# Patient Record
Sex: FEMALE | Race: BLACK OR AFRICAN AMERICAN | NOT HISPANIC OR LATINO | Employment: FULL TIME | URBAN - METROPOLITAN AREA
[De-identification: names, ages, dates, MRNs, and addresses within clinical notes are randomized per-mention and may not be internally consistent; named-entity substitution may affect disease eponyms.]

---

## 2017-09-05 ENCOUNTER — HOSPITAL ENCOUNTER (EMERGENCY)
Facility: CLINIC | Age: 47
Discharge: HOME | End: 2017-09-05
Attending: NURSE PRACTITIONER | Admitting: NURSE PRACTITIONER
Payer: COMMERCIAL

## 2017-09-05 ENCOUNTER — APPOINTMENT (OUTPATIENT)
Dept: GENERAL RADIOLOGY | Facility: CLINIC | Age: 47
End: 2017-09-05
Attending: NURSE PRACTITIONER
Payer: COMMERCIAL

## 2017-09-05 VITALS
RESPIRATION RATE: 18 BRPM | SYSTOLIC BLOOD PRESSURE: 124 MMHG | HEART RATE: 80 BPM | DIASTOLIC BLOOD PRESSURE: 76 MMHG | OXYGEN SATURATION: 99 % | TEMPERATURE: 98.7 F

## 2017-09-05 DIAGNOSIS — M25.561 ACUTE PAIN OF RIGHT KNEE: ICD-10-CM

## 2017-09-05 LAB
BASOPHILS # BLD AUTO: 0 10E9/L (ref 0–0.2)
BASOPHILS NFR BLD AUTO: 0 %
CRP SERPL-MCNC: <2.9 MG/L (ref 0–8)
DIFFERENTIAL METHOD BLD: ABNORMAL
EOSINOPHIL # BLD AUTO: 0.1 10E9/L (ref 0–0.7)
EOSINOPHIL NFR BLD AUTO: 3.1 %
ERYTHROCYTE [DISTWIDTH] IN BLOOD BY AUTOMATED COUNT: 12.8 % (ref 10–15)
ERYTHROCYTE [SEDIMENTATION RATE] IN BLOOD BY WESTERGREN METHOD: 10 MM/H (ref 0–20)
HCT VFR BLD AUTO: 37.2 % (ref 35–47)
HGB BLD-MCNC: 12.4 G/DL (ref 11.7–15.7)
IMM GRANULOCYTES # BLD: 0 10E9/L (ref 0–0.4)
IMM GRANULOCYTES NFR BLD: 0 %
LYMPHOCYTES # BLD AUTO: 1.1 10E9/L (ref 0.8–5.3)
LYMPHOCYTES NFR BLD AUTO: 27.5 %
MCH RBC QN AUTO: 31.3 PG (ref 26.5–33)
MCHC RBC AUTO-ENTMCNC: 33.3 G/DL (ref 31.5–36.5)
MCV RBC AUTO: 94 FL (ref 78–100)
MONOCYTES # BLD AUTO: 0.3 10E9/L (ref 0–1.3)
MONOCYTES NFR BLD AUTO: 8 %
NEUTROPHILS # BLD AUTO: 2.4 10E9/L (ref 1.6–8.3)
NEUTROPHILS NFR BLD AUTO: 61.4 %
NRBC # BLD AUTO: 0 10*3/UL
NRBC BLD AUTO-RTO: 0 /100
PLATELET # BLD AUTO: 281 10E9/L (ref 150–450)
RBC # BLD AUTO: 3.96 10E12/L (ref 3.8–5.2)
WBC # BLD AUTO: 3.9 10E9/L (ref 4–11)

## 2017-09-05 PROCEDURE — 85652 RBC SED RATE AUTOMATED: CPT | Performed by: NURSE PRACTITIONER

## 2017-09-05 PROCEDURE — 85025 COMPLETE CBC W/AUTO DIFF WBC: CPT | Performed by: NURSE PRACTITIONER

## 2017-09-05 PROCEDURE — 99284 EMERGENCY DEPT VISIT MOD MDM: CPT

## 2017-09-05 PROCEDURE — 73562 X-RAY EXAM OF KNEE 3: CPT | Mod: RT

## 2017-09-05 PROCEDURE — 36415 COLL VENOUS BLD VENIPUNCTURE: CPT | Performed by: NURSE PRACTITIONER

## 2017-09-05 PROCEDURE — 86140 C-REACTIVE PROTEIN: CPT | Performed by: NURSE PRACTITIONER

## 2017-09-05 RX ORDER — OXYCODONE AND ACETAMINOPHEN 5; 325 MG/1; MG/1
1-2 TABLET ORAL EVERY 6 HOURS PRN
Qty: 20 TABLET | Refills: 0 | Status: SHIPPED | OUTPATIENT
Start: 2017-09-05 | End: 2019-06-08

## 2017-09-05 ASSESSMENT — ENCOUNTER SYMPTOMS
CONSTITUTIONAL NEGATIVE: 1
CARDIOVASCULAR NEGATIVE: 1
GASTROINTESTINAL NEGATIVE: 1
RESPIRATORY NEGATIVE: 1
NEUROLOGICAL NEGATIVE: 1
PSYCHIATRIC NEGATIVE: 1

## 2017-09-05 NOTE — ED AVS SNAPSHOT
[facility] Emergency Department    [address]    [facility] [address]    Phone:  [phone]    Fax:  [phone]                                       Mary Anne Angel   MRN: [id number]    Department:  [facility] Emergency Department   Date of Visit:  9/5/2017           Patient Information     Date Of Birth          1970        Your diagnoses for this visit were:     Acute pain of right knee        You were seen by Pilo Brady, JAYDA CNP.      Follow-up Information     Follow up In 1 week.    Why:  f/u with [facility]         Follow up with [facility].    Contact information:    [address], [facility] [address]  [facility] [address]  [phone]        Discharge References/Attachments     KNEE PAIN (ENGLISH)      24 Hour Appointment Hotline       To make an appointment at any [facility] clinic, call [phone]). If you don't have a family doctor or clinic, we will help you find one. [facility] clinics are conveniently located to serve the needs of you and your family.             Review of your medicines      Notice     You have not been prescribed any medications.            Procedures and tests performed during your visit     CBC with platelets differential    CRP inflammation    Erythrocyte sedimentation rate auto    Knee XR, 3 views, right      Orders Needing Specimen Collection     None      Pending Results     No orders found from 9/3/2017 to 9/6/2017.            Pending Culture Results     No orders found from 9/3/2017 to 9/6/2017.            Pending Results Instructions     If you had any lab results that were not finalized at the time of your Discharge, you can call the ED Lab Result RN at [phone]. You will be contacted by this team for any positive Lab results or changes in treatment. The nurses are available 7 days a week from 10A to 6:30P.  You can leave a message 24 hours per day and they will return your call.         Test Results From Your Hospital Stay        9/5/2017  9:10 PM      Narrative     KNEE RIGHT THREE VIEWS  9/5/2017 8:27 PM     HISTORY: Pain and swelling.        Impression     IMPRESSION: Three views of the right knee are performed. No fracture  or dislocation. Suprapatellar effusion is present. No significant  degenerative change. Sunrise view is unremarkable with possible mild  widening of the patellofemoral joint space possibly due to an  effusion.    KM FERREIRA MD         9/5/2017  9:47 PM      Component Results     Component Value Ref Range & Units Status    WBC 3.9 (L) 4.0 - 11.0 10e9/L Final    RBC Count 3.96 3.8 - 5.2 10e12/L Final    Hemoglobin 12.4 11.7 - 15.7 g/dL Final    Hematocrit 37.2 35.0 - 47.0 % Final    MCV 94 78 - 100 fl Final    MCH 31.3 26.5 - 33.0 pg Final    MCHC 33.3 31.5 - 36.5 g/dL Final    RDW 12.8 10.0 - 15.0 % Final    Platelet Count 281 150 - 450 10e9/L Final    Diff Method Automated Method  Final    % Neutrophils 61.4 % Final    % Lymphocytes 27.5 % Final    % Monocytes 8.0 % Final    % Eosinophils 3.1 % Final    % Basophils 0.0 % Final    % Immature Granulocytes 0.0 % Final    Nucleated RBCs 0 0 /100 Final    Absolute Neutrophil 2.4 1.6 - 8.3 10e9/L Final    Absolute Lymphocytes 1.1 0.8 - 5.3 10e9/L Final    Absolute Monocytes 0.3 0.0 - 1.3 10e9/L Final    Absolute Eosinophils 0.1 0.0 - 0.7 10e9/L Final    Absolute Basophils 0.0 0.0 - 0.2 10e9/L Final    Abs Immature Granulocytes 0.0 0 - 0.4 10e9/L Final    Absolute Nucleated RBC 0.0  Final         9/5/2017 10:03 PM      Component Results     Component Value Ref Range & Units Status    CRP Inflammation <2.9 0.0 - 8.0 mg/L Final         9/5/2017  9:52 PM      Component Results     Component Value Ref Range & Units Status    Sed Rate 10 0 - 20 mm/h Final                Clinical Quality Measure: Blood Pressure Screening     Your blood pressure was checked while you were in the emergency department today. The last reading we  "obtained was  BP: 124/76 . Please read the guidelines below about what these numbers mean and what you should do about them.  If your systolic blood pressure (the top number) is less than 120 and your diastolic blood pressure (the bottom number) is less than 80, then your blood pressure is normal. There is nothing more that you need to do about it.  If your systolic blood pressure (the top number) is 120-139 or your diastolic blood pressure (the bottom number) is 80-89, your blood pressure may be higher than it should be. You should have your blood pressure rechecked within a year by a primary care provider.  If your systolic blood pressure (the top number) is 140 or greater or your diastolic blood pressure (the bottom number) is 90 or greater, you may have high blood pressure. High blood pressure is treatable, but if left untreated over time it can put you at risk for heart attack, stroke, or kidney failure. You should have your blood pressure rechecked by a primary care provider within the next 4 weeks.  If your provider in the emergency department today gave you specific instructions to follow-up with your doctor or provider even sooner than that, you should follow that instruction and not wait for up to 4 weeks for your follow-up visit.        Thank you for choosing [facility]       Thank you for choosing [facility] for your care. Our goal is always to provide you with excellent care. Hearing back from our patients is one way we can continue to improve our services. Please take a few minutes to complete the written survey that you may receive in the mail after you visit with us. Thank you!        [employer] Information     [employer] lets you send messages to your doctor, view your test results, renew your prescriptions, schedule appointments and more. To sign up, go to [email or web address] . Click on \"Log in\" on the left side of the screen, which will take you to the Welcome page. Then click on \"Sign up Now\" on the " right side of the page.     You will be asked to enter the access code listed below, as well as some personal information. Please follow the directions to create your username and password.     Your access code is: [email or web address]  Expires: 2017 10:13 PM     Your access code will  in 90 days. If you need help or a new code, please call your [facility] clinic or [phone].        Care EveryWhere ID     This is your Care EveryWhere ID. This could be used by other organizations to access your [facility] medical records  [id number]        Equal Access to Services     [facility] : Adeline Martinez, hakan blount, chuck gallo, ani nix . So [facility] [phone].    ATENCIÓN: Si habla español, tiene a azar disposición servicios gratuitos de asistencia lingüística. Llame al [phone].    We comply with applicable federal civil rights laws and [place] laws. We do not discriminate on the basis of race, color, national origin, age, disability sex, sexual orientation or gender identity.            After Visit Summary       This is your record. Keep this with you and show to your community pharmacist(s) and doctor(s) at your next visit.

## 2017-09-05 NOTE — ED AVS SNAPSHOT
[facility] Emergency Department    [address]    [facility] [address]    Phone:  [phone]    Fax:  [phone]                                       Mary Anne Angel   MRN: [id number]    Department:  [facility] Emergency Department   Date of Visit:  9/5/2017           After Visit Summary Signature Page     I have received my discharge instructions, and my questions have been answered. I have discussed any challenges I see with this plan with the nurse or doctor.    ..........................................................................................................................................  Patient/Patient Representative Signature      ..........................................................................................................................................  Patient Representative Print Name and Relationship to Patient    ..................................................               ................................................  Date                                            Time    ..........................................................................................................................................  Reviewed by Signature/Title    ...................................................              ..............................................  Date                                                            Time

## 2017-09-06 NOTE — ED PROVIDER NOTES
History     Chief Complaint:    Knee Pain      HPI   Mary Anne Angel is a 46 year old female who presents with right knee pain ongoing for 9 days, denies trauma, has swelling and pain with ambulation. Denies hip, calf or ankle pain. Denies SOB. OTC medications not effective for pain.  Has history of Lupus and arthritis.     Allergies:    No Known Allergies     Medications:        oxyCODONE-acetaminophen (PERCOCET) 5-325 MG per tablet       Problem List:      There are no active problems to display for this patient.       Past Medical History:      Past Medical History:   Diagnosis Date     Arthritis      Lupus (H)      Lupus (H)        Past Surgical History:      History reviewed. No pertinent surgical history.    Family History:      No family history on file.    Social History:    Marital Status:   [2]  Social History   Substance Use Topics     Smoking status: Current Every Day Smoker     Smokeless tobacco: Not on file     Alcohol use Yes        Review of Systems   Constitutional: Negative.    HENT: Negative.    Respiratory: Negative.    Cardiovascular: Negative.    Gastrointestinal: Negative.    Genitourinary: Negative.    Musculoskeletal:        Right knee pain and swelling.   Skin: Negative.    Neurological: Negative.    Psychiatric/Behavioral: Negative.        Physical Exam   First Vitals:  BP: 117/85  Pulse: 85  Temp: 98.7  F (37.1  C)  Resp: 16  SpO2: 99 %      Physical Exam    Eyes: Pupils equally round  HENT: Head is normal in appearance. Oropharynx is normal with moist mucus membranes.  Cardiovascular: Normal color of mucus membranes  Respiratory: Normal respiratory effort  Musculoskeletal: Right knee soft tissue swelling, no signs of infection. Pulses and CMS intact to lower extremity. ROM normal to knee and hip. No calf tenderness, negative Homans signs.   Skin: Normal, without rash.  Lymphatic: No edema  Neurologic: Cranial nerves grossly intact, normal cognition, no apparent  deficits.  Psychiatric: Normal affect.      Emergency Department Course     Imaging:  IMPRESSION: Three views of the right knee are performed. No fracture or dislocation. Suprapatellar effusion is present. No significant degenerative change. Sunrise view is unremarkable with possible mild widening of the patellofemoral joint space possibly due to an effusion.    Laboratory:  CRP <2.9 NL  Sed rate 10 NL  Wbc 3.9 L    Emergency Department Course:    ED Course       Impression & Plan      Medical Decision Making:    Mary Anne 46 female presents with right knee pain ongoing for 9 days, denies trauma. On exam has mild soft tissue swelling, normal ROM, no evidence of cellulitis. Due to duration of pain XR was performed showed no fracture or dislocation. Suprapatellar effusion is present. I do not suspect a septic joint or infection with a low wbc, the sed rate is within normal, CRP 2.9 not elevated patient is afebrile and does not appear toxic. There is no evidence of systemic illness. I did consider DVT its unlikely patient has no calf tenderness, SOB no imaging indicated. Recommend follow up with Orthopedic Clinic within one week, Provided return precautions, including but not limited to fever, SOB, chest pain, increased pain or swelling etc. Patient was discharged in stable condition.       Diagnosis:    ICD-10-CM    1. Acute pain of right knee M25.561 CBC with platelets differential     CRP inflammation     Erythrocyte sedimentation rate auto       Disposition:  discharged to home    Discharge Medications:  There are no discharge medications for this patient.        Pilo Brady  9/5/2017   [facility] EMERGENCY DEPARTMENT       Pilo Brady, APRN CNP  09/05/17 6690
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
## 2018-02-22 ENCOUNTER — APPOINTMENT (OUTPATIENT)
Dept: GENERAL RADIOLOGY | Facility: CLINIC | Age: 48
End: 2018-02-22
Attending: INTERNAL MEDICINE
Payer: COMMERCIAL

## 2018-02-22 ENCOUNTER — HOSPITAL ENCOUNTER (EMERGENCY)
Facility: CLINIC | Age: 48
Discharge: HOME | End: 2018-02-22
Attending: INTERNAL MEDICINE | Admitting: INTERNAL MEDICINE
Payer: COMMERCIAL

## 2018-02-22 VITALS
TEMPERATURE: 98.3 F | DIASTOLIC BLOOD PRESSURE: 83 MMHG | RESPIRATION RATE: 16 BRPM | WEIGHT: 150 LBS | HEIGHT: 69 IN | SYSTOLIC BLOOD PRESSURE: 124 MMHG | OXYGEN SATURATION: 98 % | BODY MASS INDEX: 22.22 KG/M2 | HEART RATE: 90 BPM

## 2018-02-22 DIAGNOSIS — R79.89 ELEVATED D-DIMER: ICD-10-CM

## 2018-02-22 DIAGNOSIS — R07.1 CHEST PAIN ON BREATHING: ICD-10-CM

## 2018-02-22 LAB
ALBUMIN SERPL-MCNC: 3.8 G/DL (ref 3.4–5)
ALP SERPL-CCNC: 59 U/L (ref 40–150)
ALT SERPL W P-5'-P-CCNC: 34 U/L (ref 0–50)
ANION GAP SERPL CALCULATED.3IONS-SCNC: 8 MMOL/L (ref 3–14)
AST SERPL W P-5'-P-CCNC: 32 U/L (ref 0–45)
BASOPHILS # BLD AUTO: 0 10E9/L (ref 0–0.2)
BASOPHILS NFR BLD AUTO: 0.5 %
BILIRUB SERPL-MCNC: 0.4 MG/DL (ref 0.2–1.3)
BUN SERPL-MCNC: 13 MG/DL (ref 7–30)
CALCIUM SERPL-MCNC: 8.4 MG/DL (ref 8.5–10.1)
CHLORIDE SERPL-SCNC: 108 MMOL/L (ref 94–109)
CO2 SERPL-SCNC: 24 MMOL/L (ref 20–32)
CREAT SERPL-MCNC: 0.57 MG/DL (ref 0.52–1.04)
D DIMER PPP FEU-MCNC: 1.3 UG/ML FEU (ref 0–0.5)
DIFFERENTIAL METHOD BLD: ABNORMAL
EOSINOPHIL # BLD AUTO: 0 10E9/L (ref 0–0.7)
EOSINOPHIL NFR BLD AUTO: 0.7 %
ERYTHROCYTE [DISTWIDTH] IN BLOOD BY AUTOMATED COUNT: 15.3 % (ref 10–15)
GFR SERPL CREATININE-BSD FRML MDRD: >90 ML/MIN/1.7M2
GLUCOSE SERPL-MCNC: 80 MG/DL (ref 70–99)
HCT VFR BLD AUTO: 37.2 % (ref 35–47)
HGB BLD-MCNC: 11.8 G/DL (ref 11.7–15.7)
IMM GRANULOCYTES # BLD: 0 10E9/L (ref 0–0.4)
IMM GRANULOCYTES NFR BLD: 0.2 %
LYMPHOCYTES # BLD AUTO: 1.3 10E9/L (ref 0.8–5.3)
LYMPHOCYTES NFR BLD AUTO: 32.4 %
MCH RBC QN AUTO: 26.4 PG (ref 26.5–33)
MCHC RBC AUTO-ENTMCNC: 31.7 G/DL (ref 31.5–36.5)
MCV RBC AUTO: 83 FL (ref 78–100)
MONOCYTES # BLD AUTO: 0.4 10E9/L (ref 0–1.3)
MONOCYTES NFR BLD AUTO: 10.1 %
NEUTROPHILS # BLD AUTO: 2.3 10E9/L (ref 1.6–8.3)
NEUTROPHILS NFR BLD AUTO: 56.1 %
NRBC # BLD AUTO: 0 10*3/UL
NRBC BLD AUTO-RTO: 0 /100
PLATELET # BLD AUTO: 426 10E9/L (ref 150–450)
POTASSIUM SERPL-SCNC: 3.8 MMOL/L (ref 3.4–5.3)
PROT SERPL-MCNC: 7.6 G/DL (ref 6.8–8.8)
RBC # BLD AUTO: 4.47 10E12/L (ref 3.8–5.2)
SODIUM SERPL-SCNC: 140 MMOL/L (ref 133–144)
TROPONIN I SERPL-MCNC: <0.015 UG/L (ref 0–0.04)
WBC # BLD AUTO: 4.1 10E9/L (ref 4–11)

## 2018-02-22 PROCEDURE — 80053 COMPREHEN METABOLIC PANEL: CPT | Performed by: INTERNAL MEDICINE

## 2018-02-22 PROCEDURE — 99285 EMERGENCY DEPT VISIT HI MDM: CPT | Mod: 25

## 2018-02-22 PROCEDURE — 93005 ELECTROCARDIOGRAM TRACING: CPT

## 2018-02-22 PROCEDURE — 85025 COMPLETE CBC W/AUTO DIFF WBC: CPT | Performed by: INTERNAL MEDICINE

## 2018-02-22 PROCEDURE — 85379 FIBRIN DEGRADATION QUANT: CPT | Performed by: INTERNAL MEDICINE

## 2018-02-22 PROCEDURE — 84484 ASSAY OF TROPONIN QUANT: CPT | Performed by: INTERNAL MEDICINE

## 2018-02-22 PROCEDURE — 71046 X-RAY EXAM CHEST 2 VIEWS: CPT

## 2018-02-23 LAB — INTERPRETATION ECG - MUSE: NORMAL

## 2018-02-23 NOTE — ED NOTES
Pt in with C/O intermittent chest pain for approx. 2 weeks. Pt reports pain has been constant for the past 2 hours. Pt A&Ox4 on arrival ABC's intact.

## 2018-02-23 NOTE — ED PROVIDER NOTES
"  History     Chief Complaint:  Chest Pain    HPI   Mary Anne Angel is a 47 year old female smoker who presents with chest pain. The patient states that for the last couple of weeks she has been experiencing intermittent chest pain every day. The pain she currently reports began a few hours ago and she describes it as pain in the left and center chest that makes it painful to inhale. The patient notes that for a few months she has had sores in her nose making it painful and difficult to breathe. She thinks that anxiety may be playing a role in this pain as she is beginning an inpatient alcohol treatment program tomorrow. The patient most recently drank alcohol earlier today. The patient denies any congestion or cough that is worse than usual.     CARDIAC RISK FACTORS:  Sex:    Female  Tobacco:   Positive  Hypertension:   Negative  Diabetes:   Negative  Family History:  Negative    PE/DVT RISK FACTORS:  Sex:    Female  Hormones:   Negative  Tobacco:   Positive  Travel:   Negative  Other immobilization: Negative  Personal history:  Negative  Family history:  Negative    Allergies:  Azithromycin     Medications:    Percocet     Past Medical History:    Arthritis  Lupus    Past Surgical History:    Surgical history reviewed. No pertinent surgical history.     Family History:    History reviewed. No pertinent family history.     Social History:  Smoking Status: Current every day smoker  Alcohol Use: Positive  Marital Status:      Review of Systems   HENT: Negative for congestion.         Nasal sores   Respiratory:        Difficulty breathing   Cardiovascular: Positive for chest pain.   All other systems reviewed and are negative.    Physical Exam     Patient Vitals for the past 24 hrs:   BP Temp Temp src Pulse Heart Rate Resp SpO2 Height Weight   02/22/18 2130 124/83 - - - 81 - 98 % - -   02/22/18 2043 135/90 98.3  F (36.8  C) Oral 90 - 16 99 % 1.753 m (5' 9\") 68 kg (150 lb)     Physical Exam   Constitutional: " She is cooperative.   HENT:   Right Ear: Tympanic membrane normal.   Left Ear: Tympanic membrane normal.   Mouth/Throat: Oropharynx is clear and moist and mucous membranes are normal.   Areas of superficial ulceration and crusting in nose   Eyes: Conjunctivae are normal.   Neck: Normal range of motion.   Cardiovascular: Regular rhythm and normal heart sounds.    Pulmonary/Chest: Effort normal and breath sounds normal. She exhibits no tenderness.   Abdominal: Soft. Normal appearance and bowel sounds are normal. There is no rebound and no guarding.   Musculoskeletal: Normal range of motion.   Lymphadenopathy:     She has no cervical adenopathy.   Neurological: She is alert.   Skin: Skin is warm and dry.   Psychiatric: She has a normal mood and affect.     Emergency Department Course     Imaging:  Radiology findings were communicated with the patient who voiced understanding of the findings.    XR Chest 2 Views  Negative exam.  MARTINE ALVARES MD  Reading per radiology    Laboratory:  Laboratory findings were communicated with the patient who voiced understanding of the findings.    CBC: WBC 4.1, HGB 11.8,   CMP: Calcium 8.4 (L) o/w WNL (Creatinine 0.57)  Troponin I (Collected 2120): <0.015    D Dimer (Collected 2120): 1.3 (H)     Emergency Department Course:    2053 Nursing notes and vitals reviewed.    2102 I performed an exam of the patient as documented above.     2120 IV was inserted and blood was drawn for laboratory testing, results above.     2148 The patient was sent for a XR Chest 2 Views while in the emergency department, results above.      2210 The patient left the emergency department.     Impression & Plan      Medical Decision Making:  Mary Anne Angel is a 47 year old female who presents to the emergency department today complaining of two kinds of chest pain. There is the constant chest pain but then also a new pleuritic pain. With this is was concerned about the possibility of pulmonary  embolus. D-dimer returned elevated. I went to the room to discuss with the patient that we needed to proceed to pulmonary angiogram but she was not in the room. Nursing staff found that she had taken her clothes, left her gown, and apparently pulled out her own IV> she did not speak to any staff about her desire to leave and I am unsure why she would have left. 12-lead EKG is unremarkable and troponin returned negative. We were unable to formulate a plan for discharge.     Diagnosis:    ICD-10-CM    1. Chest pain on breathing R07.1    2. Elevated d-dimer R79.89      Disposition:   The patient left the emergency department.     Scribe Disclosure:  I, Compa Jones, am serving as a scribe at 9:24 PM on 2/22/2018 to document services personally performed by Gianna Navarro MD, based on my observations and the provider's statements to me.    [facility] EMERGENCY DEPARTMENT       Gianna Navarro MD  02/22/18 1059

## 2019-06-08 ENCOUNTER — HOSPITAL ENCOUNTER (INPATIENT)
Facility: CLINIC | Age: 49
LOS: 1 days | Discharge: HOME | DRG: 897 | End: 2019-06-09
Attending: INTERNAL MEDICINE | Admitting: PSYCHIATRY & NEUROLOGY
Payer: COMMERCIAL

## 2019-06-08 DIAGNOSIS — F10.220 ACUTE ALCOHOLIC INTOXICATION IN ALCOHOLISM WITHOUT COMPLICATION (H): ICD-10-CM

## 2019-06-08 PROBLEM — F10.10 ALCOHOL ABUSE: Status: ACTIVE | Noted: 2019-06-08

## 2019-06-08 LAB
ALBUMIN SERPL-MCNC: 3.5 G/DL (ref 3.4–5)
ALBUMIN UR-MCNC: NEGATIVE MG/DL
ALCOHOL BREATH TEST: 0.16 (ref 0–0.01)
ALP SERPL-CCNC: 62 U/L (ref 40–150)
ALT SERPL W P-5'-P-CCNC: 19 U/L (ref 0–50)
AMPHETAMINES UR QL SCN: NEGATIVE
ANION GAP SERPL CALCULATED.3IONS-SCNC: 13 MMOL/L (ref 3–14)
APPEARANCE UR: CLEAR
AST SERPL W P-5'-P-CCNC: 18 U/L (ref 0–45)
BARBITURATES UR QL: NEGATIVE
BASOPHILS # BLD AUTO: 0 10E9/L (ref 0–0.2)
BASOPHILS NFR BLD AUTO: 0.2 %
BENZODIAZ UR QL: NEGATIVE
BILIRUB SERPL-MCNC: 0.5 MG/DL (ref 0.2–1.3)
BILIRUB UR QL STRIP: NEGATIVE
BUN SERPL-MCNC: 10 MG/DL (ref 7–30)
CALCIUM SERPL-MCNC: 8 MG/DL (ref 8.5–10.1)
CANNABINOIDS UR QL SCN: NEGATIVE
CHLORIDE SERPL-SCNC: 110 MMOL/L (ref 94–109)
CO2 SERPL-SCNC: 18 MMOL/L (ref 20–32)
COCAINE UR QL: NEGATIVE
COLOR UR AUTO: ABNORMAL
CREAT SERPL-MCNC: 0.77 MG/DL (ref 0.52–1.04)
CRP SERPL-MCNC: <2.9 MG/L (ref 0–8)
DIFFERENTIAL METHOD BLD: ABNORMAL
EOSINOPHIL # BLD AUTO: 0 10E9/L (ref 0–0.7)
EOSINOPHIL NFR BLD AUTO: 0.6 %
ERYTHROCYTE [DISTWIDTH] IN BLOOD BY AUTOMATED COUNT: 16.9 % (ref 10–15)
ETHANOL SERPL-MCNC: 0.21 G/DL
ETHANOL UR QL SCN: POSITIVE
GFR SERPL CREATININE-BSD FRML MDRD: >90 ML/MIN/{1.73_M2}
GLUCOSE SERPL-MCNC: 113 MG/DL (ref 70–99)
GLUCOSE UR STRIP-MCNC: NEGATIVE MG/DL
HCG UR QL: NEGATIVE
HCT VFR BLD AUTO: 33 % (ref 35–47)
HGB BLD-MCNC: 10.3 G/DL (ref 11.7–15.7)
HGB UR QL STRIP: NEGATIVE
IMM GRANULOCYTES # BLD: 0 10E9/L (ref 0–0.4)
IMM GRANULOCYTES NFR BLD: 0.2 %
INR PPP: 1.03 (ref 0.86–1.14)
KETONES UR STRIP-MCNC: NEGATIVE MG/DL
LEUKOCYTE ESTERASE UR QL STRIP: NEGATIVE
LIPASE SERPL-CCNC: 79 U/L (ref 73–393)
LYMPHOCYTES # BLD AUTO: 1.4 10E9/L (ref 0.8–5.3)
LYMPHOCYTES NFR BLD AUTO: 29.4 %
MAGNESIUM SERPL-MCNC: 2 MG/DL (ref 1.6–2.3)
MCH RBC QN AUTO: 24 PG (ref 26.5–33)
MCHC RBC AUTO-ENTMCNC: 31.2 G/DL (ref 31.5–36.5)
MCV RBC AUTO: 77 FL (ref 78–100)
MONOCYTES # BLD AUTO: 0.3 10E9/L (ref 0–1.3)
MONOCYTES NFR BLD AUTO: 6.9 %
MUCOUS THREADS #/AREA URNS LPF: PRESENT /LPF
NEUTROPHILS # BLD AUTO: 2.9 10E9/L (ref 1.6–8.3)
NEUTROPHILS NFR BLD AUTO: 62.7 %
NITRATE UR QL: NEGATIVE
NRBC # BLD AUTO: 0 10*3/UL
NRBC BLD AUTO-RTO: 0 /100
OPIATES UR QL SCN: NEGATIVE
PH UR STRIP: 5 PH (ref 5–7)
PLATELET # BLD AUTO: 285 10E9/L (ref 150–450)
POTASSIUM SERPL-SCNC: 3.2 MMOL/L (ref 3.4–5.3)
PROT SERPL-MCNC: 7.2 G/DL (ref 6.8–8.8)
RBC # BLD AUTO: 4.3 10E12/L (ref 3.8–5.2)
RBC #/AREA URNS AUTO: 1 /HPF (ref 0–2)
SODIUM SERPL-SCNC: 141 MMOL/L (ref 133–144)
SOURCE: ABNORMAL
SP GR UR STRIP: 1.01 (ref 1–1.03)
SQUAMOUS #/AREA URNS AUTO: 1 /HPF (ref 0–1)
TROPONIN I SERPL-MCNC: <0.015 UG/L (ref 0–0.04)
UROBILINOGEN UR STRIP-MCNC: NORMAL MG/DL (ref 0–2)
WBC # BLD AUTO: 4.6 10E9/L (ref 4–11)
WBC #/AREA URNS AUTO: 0 /HPF (ref 0–5)

## 2019-06-08 PROCEDURE — 80307 DRUG TEST PRSMV CHEM ANLYZR: CPT | Performed by: INTERNAL MEDICINE

## 2019-06-08 PROCEDURE — 96361 HYDRATE IV INFUSION ADD-ON: CPT | Performed by: INTERNAL MEDICINE

## 2019-06-08 PROCEDURE — 80320 DRUG SCREEN QUANTALCOHOLS: CPT | Performed by: INTERNAL MEDICINE

## 2019-06-08 PROCEDURE — 81025 URINE PREGNANCY TEST: CPT | Performed by: INTERNAL MEDICINE

## 2019-06-08 PROCEDURE — 82075 ASSAY OF BREATH ETHANOL: CPT | Performed by: INTERNAL MEDICINE

## 2019-06-08 PROCEDURE — 25000132 ZZH RX MED GY IP 250 OP 250 PS 637: Performed by: INTERNAL MEDICINE

## 2019-06-08 PROCEDURE — 93005 ELECTROCARDIOGRAM TRACING: CPT | Performed by: INTERNAL MEDICINE

## 2019-06-08 PROCEDURE — 80053 COMPREHEN METABOLIC PANEL: CPT | Performed by: INTERNAL MEDICINE

## 2019-06-08 PROCEDURE — 93010 ELECTROCARDIOGRAM REPORT: CPT | Mod: Z6 | Performed by: INTERNAL MEDICINE

## 2019-06-08 PROCEDURE — 83690 ASSAY OF LIPASE: CPT | Performed by: INTERNAL MEDICINE

## 2019-06-08 PROCEDURE — 99284 EMERGENCY DEPT VISIT MOD MDM: CPT | Mod: 25 | Performed by: INTERNAL MEDICINE

## 2019-06-08 PROCEDURE — 99285 EMERGENCY DEPT VISIT HI MDM: CPT | Mod: 25 | Performed by: INTERNAL MEDICINE

## 2019-06-08 PROCEDURE — 83735 ASSAY OF MAGNESIUM: CPT | Performed by: INTERNAL MEDICINE

## 2019-06-08 PROCEDURE — 85610 PROTHROMBIN TIME: CPT | Performed by: INTERNAL MEDICINE

## 2019-06-08 PROCEDURE — 12800008 ZZH R&B CD ADULT

## 2019-06-08 PROCEDURE — 96360 HYDRATION IV INFUSION INIT: CPT | Performed by: INTERNAL MEDICINE

## 2019-06-08 PROCEDURE — 86140 C-REACTIVE PROTEIN: CPT | Performed by: INTERNAL MEDICINE

## 2019-06-08 PROCEDURE — 25000128 H RX IP 250 OP 636: Performed by: INTERNAL MEDICINE

## 2019-06-08 PROCEDURE — HZ2ZZZZ DETOXIFICATION SERVICES FOR SUBSTANCE ABUSE TREATMENT: ICD-10-PCS | Performed by: PSYCHIATRY & NEUROLOGY

## 2019-06-08 PROCEDURE — 84484 ASSAY OF TROPONIN QUANT: CPT | Performed by: INTERNAL MEDICINE

## 2019-06-08 PROCEDURE — 81001 URINALYSIS AUTO W/SCOPE: CPT | Performed by: INTERNAL MEDICINE

## 2019-06-08 PROCEDURE — 85025 COMPLETE CBC W/AUTO DIFF WBC: CPT | Performed by: INTERNAL MEDICINE

## 2019-06-08 RX ORDER — GABAPENTIN 100 MG/1
100-200 CAPSULE ORAL 2 TIMES DAILY PRN
Status: DISCONTINUED | OUTPATIENT
Start: 2019-06-08 | End: 2019-06-09 | Stop reason: HOSPADM

## 2019-06-08 RX ORDER — LANOLIN ALCOHOL/MO/W.PET/CERES
100 CREAM (GRAM) TOPICAL ONCE
Status: COMPLETED | OUTPATIENT
Start: 2019-06-08 | End: 2019-06-08

## 2019-06-08 RX ORDER — FOLIC ACID 1 MG/1
1 TABLET ORAL ONCE
Status: COMPLETED | OUTPATIENT
Start: 2019-06-08 | End: 2019-06-08

## 2019-06-08 RX ORDER — BUPROPION HYDROCHLORIDE 150 MG/1
150 TABLET ORAL EVERY MORNING
COMMUNITY
End: 2020-02-25

## 2019-06-08 RX ORDER — ATENOLOL 50 MG/1
50 TABLET ORAL DAILY PRN
Status: DISCONTINUED | OUTPATIENT
Start: 2019-06-08 | End: 2019-06-09 | Stop reason: HOSPADM

## 2019-06-08 RX ORDER — NICOTINE 21 MG/24HR
1 PATCH, TRANSDERMAL 24 HOURS TRANSDERMAL DAILY
Status: DISCONTINUED | OUTPATIENT
Start: 2019-06-09 | End: 2019-06-09 | Stop reason: HOSPADM

## 2019-06-08 RX ORDER — GABAPENTIN 100 MG/1
100-200 CAPSULE ORAL 2 TIMES DAILY PRN
COMMUNITY
End: 2021-03-11

## 2019-06-08 RX ORDER — ALUMINA, MAGNESIA, AND SIMETHICONE 2400; 2400; 240 MG/30ML; MG/30ML; MG/30ML
30 SUSPENSION ORAL EVERY 4 HOURS PRN
Status: DISCONTINUED | OUTPATIENT
Start: 2019-06-08 | End: 2019-06-09 | Stop reason: HOSPADM

## 2019-06-08 RX ORDER — FOLIC ACID 1 MG/1
1 TABLET ORAL DAILY
Status: DISCONTINUED | OUTPATIENT
Start: 2019-06-09 | End: 2019-06-09 | Stop reason: HOSPADM

## 2019-06-08 RX ORDER — LANOLIN ALCOHOL/MO/W.PET/CERES
100 CREAM (GRAM) TOPICAL DAILY
Status: DISCONTINUED | OUTPATIENT
Start: 2019-06-09 | End: 2019-06-09 | Stop reason: HOSPADM

## 2019-06-08 RX ORDER — DISULFIRAM 250 MG/1
250 TABLET ORAL DAILY
COMMUNITY
End: 2020-02-25

## 2019-06-08 RX ORDER — ONDANSETRON 4 MG/1
4 TABLET, ORALLY DISINTEGRATING ORAL EVERY 6 HOURS PRN
Status: DISCONTINUED | OUTPATIENT
Start: 2019-06-08 | End: 2019-06-09 | Stop reason: HOSPADM

## 2019-06-08 RX ORDER — TRAZODONE HYDROCHLORIDE 50 MG/1
50 TABLET, FILM COATED ORAL
Status: DISCONTINUED | OUTPATIENT
Start: 2019-06-08 | End: 2019-06-09 | Stop reason: HOSPADM

## 2019-06-08 RX ORDER — MULTIPLE VITAMINS W/ MINERALS TAB 9MG-400MCG
1 TAB ORAL DAILY
Status: DISCONTINUED | OUTPATIENT
Start: 2019-06-09 | End: 2019-06-09 | Stop reason: HOSPADM

## 2019-06-08 RX ORDER — DIAZEPAM 5 MG
5-20 TABLET ORAL EVERY 30 MIN PRN
Status: DISCONTINUED | OUTPATIENT
Start: 2019-06-08 | End: 2019-06-09 | Stop reason: HOSPADM

## 2019-06-08 RX ORDER — ACETAMINOPHEN 325 MG/1
650 TABLET ORAL EVERY 4 HOURS PRN
Status: DISCONTINUED | OUTPATIENT
Start: 2019-06-08 | End: 2019-06-09 | Stop reason: HOSPADM

## 2019-06-08 RX ORDER — SODIUM CHLORIDE 9 MG/ML
1000 INJECTION, SOLUTION INTRAVENOUS CONTINUOUS
Status: DISCONTINUED | OUTPATIENT
Start: 2019-06-08 | End: 2019-06-08

## 2019-06-08 RX ORDER — MULTIPLE VITAMINS W/ MINERALS TAB 9MG-400MCG
1 TAB ORAL ONCE
Status: COMPLETED | OUTPATIENT
Start: 2019-06-08 | End: 2019-06-08

## 2019-06-08 RX ORDER — DIAZEPAM 5 MG
5 TABLET ORAL ONCE
Status: DISCONTINUED | OUTPATIENT
Start: 2019-06-08 | End: 2019-06-08

## 2019-06-08 RX ORDER — HYDROXYZINE HYDROCHLORIDE 25 MG/1
25 TABLET, FILM COATED ORAL EVERY 4 HOURS PRN
Status: DISCONTINUED | OUTPATIENT
Start: 2019-06-08 | End: 2019-06-09 | Stop reason: HOSPADM

## 2019-06-08 RX ADMIN — Medication 100 MG: at 17:58

## 2019-06-08 RX ADMIN — FOLIC ACID 1 MG: 1 TABLET ORAL at 17:58

## 2019-06-08 RX ADMIN — MULTIPLE VITAMINS W/ MINERALS TAB 1 TABLET: TAB at 17:58

## 2019-06-08 RX ADMIN — SODIUM CHLORIDE 1000 ML: 9 INJECTION, SOLUTION INTRAVENOUS at 17:58

## 2019-06-08 RX ADMIN — NICOTINE POLACRILEX 4 MG: 4 GUM, CHEWING ORAL at 17:58

## 2019-06-08 ASSESSMENT — ACTIVITIES OF DAILY LIVING (ADL)
HYGIENE/GROOMING: HANDWASHING;INDEPENDENT
PRIOR_FUNCTIONAL_LEVEL_COMMENT: INDEPENDENT
TOILETING: 0-->INDEPENDENT
TRANSFERRING: 0-->INDEPENDENT
RETIRED_COMMUNICATION: 0-->UNDERSTANDS/COMMUNICATES WITHOUT DIFFICULTY
ORAL_HYGIENE: INDEPENDENT
DRESS: 0-->INDEPENDENT
AMBULATION: 0-->INDEPENDENT
COGNITION: 0 - NO COGNITION ISSUES REPORTED
FALL_HISTORY_WITHIN_LAST_SIX_MONTHS: NO
SWALLOWING: 0-->SWALLOWS FOODS/LIQUIDS WITHOUT DIFFICULTY
BATHING: 0-->INDEPENDENT
DRESS: INDEPENDENT
RETIRED_EATING: 0-->INDEPENDENT
LAUNDRY: WITH SUPERVISION

## 2019-06-08 ASSESSMENT — ENCOUNTER SYMPTOMS
TREMORS: 0
VOMITING: 0
TROUBLE SWALLOWING: 0
NERVOUS/ANXIOUS: 1
DIARRHEA: 0
CHILLS: 0
WHEEZING: 0
NECK PAIN: 0
SLEEP DISTURBANCE: 1
NAUSEA: 0
SPEECH DIFFICULTY: 0
COUGH: 0
DIFFICULTY URINATING: 0
SHORTNESS OF BREATH: 0
CONFUSION: 0
ABDOMINAL PAIN: 0
BACK PAIN: 0
DYSPHORIC MOOD: 0
WEAKNESS: 0
LIGHT-HEADEDNESS: 0
DYSURIA: 0
FEVER: 0
PALPITATIONS: 0
NUMBNESS: 1
ARTHRALGIAS: 1
HEADACHES: 0
APPETITE CHANGE: 1
ADENOPATHY: 0

## 2019-06-08 ASSESSMENT — MIFFLIN-ST. JEOR: SCORE: 1425.14

## 2019-06-08 NOTE — ED PROVIDER NOTES
History     Chief Complaint   Patient presents with     Addiction Problem     ETOH: last drink 30 min ago: 750 ml vodka daily: no hx seizure: three weeks for last relapse: has bed on hold;      Addiction Problem     has palpations when going through detox     HPI  Mary Anne Angel is a 48 year old female who presents today seeking detox from alcohol. She has relapsed on alcohol 3 weeks ago, currently drinking 750 ml alcohol/ day. She has a long history of alcohol dependence. She states problem alcohol use began in her teens. She has been through treatment here in 1995 with 14 years sobriety. She had another 6 years sobriety after a second treatment. She again relapsed starting in 2017. She has had 2 treatments at [facility] last year, with a few weeks to a few months sobriety. Her most recent elapse started 3 weeks ago. She works as a , and missed a lot of work as a result of drinking., She has been drinking daily since the school year ended 3 weeks ago. She lives with her  and 14 year old twin daughters. She denies substance use other than alcohol. She had been diagnosed with Lupus in the past. She states she was on Plaquenil for that in the past, but that about 5 years ago it was decided she did not actually have lupus, but has rheumatoid arthritis. She is currently on no treatment for the arthritis. She has anxiety, tremulousness, numbness in her feet and chest pain and right ear pain when she withdraws. She had a couple of absence type seizures several years ago which were determined to be of uncertain cause and significance, she has never had a tonic/clonic motor seizure. She denies liver or pancreas issues. She has significant issues with anxiety. Sleep is poor. She has some depression only with alcohol use, but has never been diagnosed with depression. She has chest pain and numbness in her hands and feet at times that she feels may be anxiety related. No SI/HI/hallucinations.  Appetite is fair. She feels a bit dehydrated. Urine is dark. She denies fever, chills, URI symptoms, cough, sputum, shortness of breath, nausea, vomiting, abdominal pain, diarrhea. She has intermittent pain in the right ear and left ankle.    Past Medical History:   Diagnosis Date     Arthritis      Lupus      Lupus    States lupus diagnosis was incorrect.    No past surgical history on file.    No family history on file.    Social History     Tobacco Use     Smoking status: Current Every Day Smoker   Substance Use Topics     Alcohol use: Yes         I have reviewed the Medications, Allergies, Past Medical and Surgical History, and Social History in the Epic system.    Review of Systems   Constitutional: Positive for appetite change. Negative for chills and fever.   HENT: Positive for ear pain. Negative for congestion and trouble swallowing.    Eyes: Negative for visual disturbance.   Respiratory: Negative for cough, shortness of breath and wheezing.    Cardiovascular: Positive for chest pain (with withdrawal). Negative for palpitations and leg swelling.   Gastrointestinal: Negative for abdominal pain, diarrhea, nausea and vomiting.   Genitourinary: Positive for menstrual problem (irregular). Negative for difficulty urinating and dysuria.   Musculoskeletal: Positive for arthralgias. Negative for back pain and neck pain.   Skin: Negative for rash.   Neurological: Positive for numbness. Negative for tremors, speech difficulty, weakness, light-headedness and headaches.   Hematological: Negative for adenopathy.   Psychiatric/Behavioral: Positive for sleep disturbance. Negative for confusion, dysphoric mood and suicidal ideas. The patient is nervous/anxious.        Physical Exam   BP: 128/84  Pulse: 116  Temp: 98.1  F (36.7  C)  Resp: 16  SpO2: 97 %      Physical Exam   Constitutional: She is oriented to person, place, and time. She appears well-developed and well-nourished. She appears distressed.   HENT:   Head:  Normocephalic and atraumatic.   Right Ear: Hearing and tympanic membrane normal.   Left Ear: Hearing and tympanic membrane normal.   Nose: Nose normal.   Mouth/Throat: Uvula is midline, oropharynx is clear and moist and mucous membranes are normal.   Eyes: Pupils are equal, round, and reactive to light. Conjunctivae are normal. Right eye exhibits nystagmus. Left eye exhibits nystagmus.   Neck: Normal range of motion. Neck supple. No JVD present.   Cardiovascular: Regular rhythm, S1 normal, S2 normal, normal heart sounds and intact distal pulses. Tachycardia present.   Pulmonary/Chest: Effort normal and breath sounds normal. She has no wheezes. She has no rales.   Abdominal: Soft. Bowel sounds are normal. There is no tenderness. There is no rebound and no guarding.   Musculoskeletal: She exhibits no edema or tenderness.   Neurological: She is alert and oriented to person, place, and time. She displays no tremor. No cranial nerve deficit or sensory deficit. Coordination normal. GCS eye subscore is 4. GCS verbal subscore is 5. GCS motor subscore is 6.   Reflex Scores:       Bicep reflexes are 2+ on the right side and 2+ on the left side.       Brachioradialis reflexes are 2+ on the right side and 2+ on the left side.       Patellar reflexes are 2+ on the right side and 2+ on the left side.  Skin: Skin is warm and dry.   Psychiatric: She has a normal mood and affect. Her speech is normal and behavior is normal. Judgment and thought content normal. Thought content is not paranoid. Cognition and memory are normal. She expresses no homicidal and no suicidal ideation.   Nursing note and vitals reviewed.      ED Course        Procedures             EKG Interpretation:      Interpreted by LUKASZ SHARMA  Time reviewed: 1633  Symptoms at time of EKG: None   Rhythm: normal sinus   Rate: 100-110  Axis: Normal  Ectopy: none  Conduction: right bundle branch block (incomplete)  ST Segments/ T Waves: No ST-T wave changes and No  acute ischemic changes  Q Waves: none  Comparison to prior: Unchanged from 02/22/18    Clinical Impression: no acute changes and non-specific EKG    Labs/Imaging    Results for orders placed or performed during the hospital encounter of 06/08/19 (from the past 24 hour(s))   Alcohol breath test POCT   Result Value Ref Range    Alcohol Breath Test 0.162 (A) 0.00 - 0.01   CBC with platelets differential   Result Value Ref Range    WBC 4.6 4.0 - 11.0 10e9/L    RBC Count 4.30 3.8 - 5.2 10e12/L    Hemoglobin 10.3 (L) 11.7 - 15.7 g/dL    Hematocrit 33.0 (L) 35.0 - 47.0 %    MCV 77 (L) 78 - 100 fl    MCH 24.0 (L) 26.5 - 33.0 pg    MCHC 31.2 (L) 31.5 - 36.5 g/dL    RDW 16.9 (H) 10.0 - 15.0 %    Platelet Count 285 150 - 450 10e9/L    Diff Method Automated Method     % Neutrophils 62.7 %    % Lymphocytes 29.4 %    % Monocytes 6.9 %    % Eosinophils 0.6 %    % Basophils 0.2 %    % Immature Granulocytes 0.2 %    Nucleated RBCs 0 0 /100    Absolute Neutrophil 2.9 1.6 - 8.3 10e9/L    Absolute Lymphocytes 1.4 0.8 - 5.3 10e9/L    Absolute Monocytes 0.3 0.0 - 1.3 10e9/L    Absolute Eosinophils 0.0 0.0 - 0.7 10e9/L    Absolute Basophils 0.0 0.0 - 0.2 10e9/L    Abs Immature Granulocytes 0.0 0 - 0.4 10e9/L    Absolute Nucleated RBC 0.0    Comprehensive metabolic panel   Result Value Ref Range    Sodium 141 133 - 144 mmol/L    Potassium 3.2 (L) 3.4 - 5.3 mmol/L    Chloride 110 (H) 94 - 109 mmol/L    Carbon Dioxide 18 (L) 20 - 32 mmol/L    Anion Gap 13 3 - 14 mmol/L    Glucose 113 (H) 70 - 99 mg/dL    Urea Nitrogen 10 7 - 30 mg/dL    Creatinine 0.77 0.52 - 1.04 mg/dL    GFR Estimate >90 >60 mL/min/[1.73_m2]    GFR Estimate If Black >90 >60 mL/min/[1.73_m2]    Calcium 8.0 (L) 8.5 - 10.1 mg/dL    Bilirubin Total 0.5 0.2 - 1.3 mg/dL    Albumin 3.5 3.4 - 5.0 g/dL    Protein Total 7.2 6.8 - 8.8 g/dL    Alkaline Phosphatase 62 40 - 150 U/L    ALT 19 0 - 50 U/L    AST 18 0 - 45 U/L   INR   Result Value Ref Range    INR 1.03 0.86 - 1.14   Alcohol  ethyl   Result Value Ref Range    Ethanol g/dL 0.21 (H) <0.01 g/dL   Magnesium   Result Value Ref Range    Magnesium 2.0 1.6 - 2.3 mg/dL   CRP inflammation   Result Value Ref Range    CRP Inflammation <2.9 0.0 - 8.0 mg/L   Lipase   Result Value Ref Range    Lipase 79 73 - 393 U/L   Troponin I   Result Value Ref Range    Troponin I ES <0.015 0.000 - 0.045 ug/L   UA with Microscopic   Result Value Ref Range    Color Urine Light Yellow     Appearance Urine Clear     Glucose Urine Negative NEG^Negative mg/dL    Bilirubin Urine Negative NEG^Negative    Ketones Urine Negative NEG^Negative mg/dL    Specific Gravity Urine 1.006 1.003 - 1.035    Blood Urine Negative NEG^Negative    pH Urine 5.0 5.0 - 7.0 pH    Protein Albumin Urine Negative NEG^Negative mg/dL    Urobilinogen mg/dL Normal 0.0 - 2.0 mg/dL    Nitrite Urine Negative NEG^Negative    Leukocyte Esterase Urine Negative NEG^Negative    Source Midstream Urine     WBC Urine 0 0 - 5 /HPF    RBC Urine 1 0 - 2 /HPF    Squamous Epithelial /HPF Urine 1 0 - 1 /HPF    Mucous Urine Present (A) NEG^Negative /LPF   HCG qualitative urine   Result Value Ref Range    HCG Qual Urine Negative NEG^Negative   Drug abuse screen 6 urine (chem dep)   Result Value Ref Range    Amphetamine Qual Urine Negative NEG^Negative    Barbiturates Qual Urine Negative NEG^Negative    Benzodiazepine Qual Urine Negative NEG^Negative    Cannabinoids Qual Urine Negative NEG^Negative    Cocaine Qual Urine Negative NEG^Negative    Ethanol Qual Urine Positive (A) NEG^Negative    Opiates Qualitative Urine Negative NEG^Negative         Assessments & Plan (with Medical Decision Making)   Impression:  Middle aged female with a history of alcohol abuse/ dependence dating to teens. She had prolonged sobriety after treatment in 1995 and 2010. She has had less success maintaining sobriety in the past 2 years. She has been drinking daily for the past 3 weeks. 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 She has had periods of sobriety of weeks to a few months in the past 2 years. She is currently drinking 750 ml vodka daily. She as anxiety, tingling in hands and feet and chest tightness with withdrawal. She has the chest tightness associated with anxiety. She has no evidence of acute coronary syndrome. EKG is unchanged. She has unremarkable CBC, electrolytes, lipase, troponin and UA. She appears medically stable for detox treatment. She has longstanding anxiety issues. She does not have significant depression or active psychiatric symptoms.     I have reviewed the nursing notes.    I have reviewed the findings, diagnosis, plan and need for follow up with the patient.       Medication List      There are no discharge medications for this visit.         Final diagnoses:   Acute alcoholic intoxication in alcoholism without complication (H)       6/8/2019   [facility], [place], EMERGENCY DEPARTMENT     Pepe Lovell MD  06/08/19 6107

## 2019-06-08 NOTE — ED NOTES
"Pt declined the valium. States she wants the valium when she \"really needs it.\" I returned the valium to pyxis.    "

## 2019-06-09 VITALS
HEIGHT: 69 IN | DIASTOLIC BLOOD PRESSURE: 84 MMHG | BODY MASS INDEX: 23.7 KG/M2 | WEIGHT: 160 LBS | HEART RATE: 73 BPM | TEMPERATURE: 98 F | OXYGEN SATURATION: 99 % | RESPIRATION RATE: 16 BRPM | SYSTOLIC BLOOD PRESSURE: 128 MMHG

## 2019-06-09 LAB
ANION GAP SERPL CALCULATED.3IONS-SCNC: 9 MMOL/L (ref 3–14)
BUN SERPL-MCNC: 10 MG/DL (ref 7–30)
CALCIUM SERPL-MCNC: 8.1 MG/DL (ref 8.5–10.1)
CHLORIDE SERPL-SCNC: 109 MMOL/L (ref 94–109)
CHOLEST SERPL-MCNC: 130 MG/DL
CO2 SERPL-SCNC: 21 MMOL/L (ref 20–32)
CREAT SERPL-MCNC: 0.74 MG/DL (ref 0.52–1.04)
GFR SERPL CREATININE-BSD FRML MDRD: >90 ML/MIN/{1.73_M2}
GGT SERPL-CCNC: 15 U/L (ref 0–40)
GLUCOSE SERPL-MCNC: 86 MG/DL (ref 70–99)
HDLC SERPL-MCNC: 79 MG/DL
LDLC SERPL CALC-MCNC: 43 MG/DL
MAGNESIUM SERPL-MCNC: 2 MG/DL (ref 1.6–2.3)
NONHDLC SERPL-MCNC: 51 MG/DL
POTASSIUM SERPL-SCNC: 3.9 MMOL/L (ref 3.4–5.3)
SODIUM SERPL-SCNC: 139 MMOL/L (ref 133–144)
TRIGL SERPL-MCNC: 40 MG/DL
TSH SERPL DL<=0.005 MIU/L-ACNC: 1.34 MU/L (ref 0.4–4)
VIT B12 SERPL-MCNC: 412 PG/ML (ref 193–986)

## 2019-06-09 PROCEDURE — 99207 ZZC CONSULT E&M CHANGED TO SUBSEQUENT LEVEL: CPT | Performed by: PHYSICIAN ASSISTANT

## 2019-06-09 PROCEDURE — 93005 ELECTROCARDIOGRAM TRACING: CPT

## 2019-06-09 PROCEDURE — 36415 COLL VENOUS BLD VENIPUNCTURE: CPT | Performed by: PSYCHIATRY & NEUROLOGY

## 2019-06-09 PROCEDURE — 80061 LIPID PANEL: CPT | Performed by: PSYCHIATRY & NEUROLOGY

## 2019-06-09 PROCEDURE — 25000132 ZZH RX MED GY IP 250 OP 250 PS 637: Performed by: PSYCHIATRY & NEUROLOGY

## 2019-06-09 PROCEDURE — 82306 VITAMIN D 25 HYDROXY: CPT | Performed by: PSYCHIATRY & NEUROLOGY

## 2019-06-09 PROCEDURE — 84443 ASSAY THYROID STIM HORMONE: CPT | Performed by: PSYCHIATRY & NEUROLOGY

## 2019-06-09 PROCEDURE — 99232 SBSQ HOSP IP/OBS MODERATE 35: CPT | Performed by: PHYSICIAN ASSISTANT

## 2019-06-09 PROCEDURE — 83735 ASSAY OF MAGNESIUM: CPT | Performed by: PHYSICIAN ASSISTANT

## 2019-06-09 PROCEDURE — 93010 ELECTROCARDIOGRAM REPORT: CPT | Performed by: INTERNAL MEDICINE

## 2019-06-09 PROCEDURE — 82977 ASSAY OF GGT: CPT | Performed by: PSYCHIATRY & NEUROLOGY

## 2019-06-09 PROCEDURE — H2032 ACTIVITY THERAPY, PER 15 MIN: HCPCS

## 2019-06-09 PROCEDURE — 99223 1ST HOSP IP/OBS HIGH 75: CPT | Mod: AI | Performed by: PSYCHIATRY & NEUROLOGY

## 2019-06-09 PROCEDURE — 83735 ASSAY OF MAGNESIUM: CPT | Performed by: PSYCHIATRY & NEUROLOGY

## 2019-06-09 PROCEDURE — 82607 VITAMIN B-12: CPT | Performed by: PSYCHIATRY & NEUROLOGY

## 2019-06-09 PROCEDURE — 80048 BASIC METABOLIC PNL TOTAL CA: CPT | Performed by: PSYCHIATRY & NEUROLOGY

## 2019-06-09 RX ADMIN — FOLIC ACID 1 MG: 1 TABLET ORAL at 08:44

## 2019-06-09 RX ADMIN — GABAPENTIN 200 MG: 100 CAPSULE ORAL at 00:22

## 2019-06-09 RX ADMIN — MULTIPLE VITAMINS W/ MINERALS TAB 1 TABLET: TAB at 08:43

## 2019-06-09 RX ADMIN — Medication 100 MG: at 08:43

## 2019-06-09 RX ADMIN — GABAPENTIN 200 MG: 100 CAPSULE ORAL at 12:27

## 2019-06-09 ASSESSMENT — ACTIVITIES OF DAILY LIVING (ADL)
LAUNDRY: WITH SUPERVISION
HYGIENE/GROOMING: INDEPENDENT
ORAL_HYGIENE: INDEPENDENT
DRESS: INDEPENDENT

## 2019-06-09 NOTE — PHARMACY-ADMISSION MEDICATION HISTORY
Admission medication history for the June 8, 2019 admission is complete.     Interview sources:  Patient interview; Eugenia [id number]    Reliability of source: Good    Medication compliance: Poor - patient open about missing doses of all medications.    Preferred Outpatient Pharmacy: [facility] (NO scripts yet - patient moved and will be using the pharmacy    Changes made to PTA medication list (reason)  Added: all  Deleted: Oxycodone-APAP 5/325 mg -Take 1-2 by mouth every 6 hours PRN (patient denies taking and no recent fill history)  Changed: none    Additional medication history information:     Patient stated she has not taken disulfiram in weeks, gabapentin in 2 days, and Bupropion she took one a few days ago but is not consistent with it.    Prior to Admission Medication List:  Prior to Admission medications    Medication Sig Last Dose Taking? Auth Provider   disulfiram (ANTABUSE) 250 MG tablet Take 250 mg by mouth daily Past Month at Unknown time Yes Unknown, Entered By History   buPROPion (WELLBUTRIN XL) 150 MG 24 hr tablet Take 150 mg by mouth every morning More than a month at Unknown time  Unknown, Entered By History   gabapentin (NEURONTIN) 100 MG capsule Take 100-200 mg by mouth 2 times daily as needed More than a month at Unknown time  Unknown, Entered By History       Time spent: 20 minutes    Medication history completed by:   Sanjeev Villareal, Pharm.D. [facility]  Available daily from 1-9 pm  Phone: [phone] Ascom:*[phone] Pager: [phone]
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
## 2019-06-09 NOTE — PROGRESS NOTES
The patient has been up and in the milieu today.  She exhibits a range of affect.  She denies any thoughts of self harm.  She may decide to leave today depending on how she feels.  She states that she has support.  She plans to use Antabuse and to return to her 12 step groups.  She will also return to her treatment at [facility] and [facility]..  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

## 2019-06-09 NOTE — PROGRESS NOTES
Case Management Note  6/9/2019    Met with pt to discuss discharge plans. Patient reports she plans to return to taking her antabuse, attend meetings. 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 Patient reports she has a psychiatrist Ubaldo Restrepo @ [facility]. Pt also sees a therapist about once a month at [facility] [place]- may try to increase frequency of therapy for added support. Pt declined any meeting resources at this time or referral to treatment. Pt was open to outpatient program information she can choose to pursue if she decides she needs further support after discharge. Outpatient program options added to AVS.     Patient also asked about leaving this evening. Patient reported she is not taking the valium because she feels she doesn't need it, doesn't think she really needs the detox bed. Pt encouraged to work with RN/MD on these issues.     Elizabeth Flor, SHIKHA, LADC

## 2019-06-09 NOTE — PROGRESS NOTES
06/08/19 9794   Patient Belongings   Did you bring any home meds/supplements to the hospital?  No   Patient Belongings other (see comments)   Belongings Search Yes   Clothing Search Yes   Second Staff Marycarmen and Genevieve RN   Comment See Notes     Big Bin: Tote bag, with tan tops, toiletries, make up products, sharps(Keys,cig,lighter), purse w/nails polish, loose papers.  Small Bin: Cell phone  Valuables # [id number]: Visz- [phone]; [place] D/L 2, Health ins card  A               Admission:  I am responsible for any personal items that are not sent to the safe or pharmacy.  [facility] is not responsible for loss, theft or damage of any property in my possession.    Signature:  _________________________________ Date: _______  Time: _____                                              Staff Signature:  ____________________________ Date: ________  Time: _____      2nd Staff person, if patient is unable/unwilling to sign:    Signature: ________________________________ Date: ________  Time: _____     Discharge:  [facility] has returned all of my personal belongings:    Signature: _________________________________ Date: ________  Time: _____                                          Staff Signature:  ____________________________ Date: ________  Time: _____

## 2019-06-09 NOTE — ED NOTES
ED to Behavioral Floor Handoff    SITUATION  Mary Anne Angel is a 48 year old female who speaks English and lives in a home with family members The patient arrived in the ED by cab from home with a complaint of Addiction Problem (ETOH: last drink 30 min ago: 750 ml vodka daily: no hx seizure: three weeks for last relapse: has bed on hold; ) and Addiction Problem (has palpations when going through detox)  .The patient's current symptoms started/worsened 2 week(s) ago and during this time the symptoms have increased.   In the ED, pt was diagnosed with   Final diagnoses:   Acute alcoholic intoxication in alcoholism without complication (H)        Initial vitals were: BP: 128/84  Pulse: 116  Temp: 98.1  F (36.7  C)  Resp: 16  SpO2: 97 %   --------  Is the patient diabetic? No   If yes, last blood glucose? --     If yes, was this treated in the ED? --  --------  Is the patient inebriated (ETOH) Yes or Impaired on other substances? Yes  MSSA done? Yes  Last MSSA score: --    Were withdrawal symptoms treated? No  Does the patient have a seizure history? No. If yes, date of most recent seizure--  --------  Is the patient patient experiencing suicidal ideation? denies current or recent suicidal ideation     Homicidal ideation? denies current or recent homicidal ideation or behaviors.    Self-injurious behavior/urges? denies current or recent self injurious behavior or ideation.  ------  Was pt aggressive in the ED No  Was a code called No  Is the pt now cooperative? Yes  -------  Meds given in ED:   Medications   0.9% sodium chloride BOLUS (1,000 mLs Intravenous New Bag 6/8/19 1758)     Followed by   sodium chloride 0.9% infusion (has no administration in time range)   nicotine polacrilex (NICORETTE) gum 4 mg (4 mg Buccal Given 6/8/19 1758)   diazepam (VALIUM) tablet 5 mg (5 mg Oral Not Given 6/8/19 2001)   vitamin B1 (THIAMINE) tablet 100 mg (100 mg Oral Given 6/8/19 1758)   multivitamin w/minerals (THERA-VIT-M) tablet 1  tablet (1 tablet Oral Given 6/8/19 1758)   folic acid (FOLVITE) tablet 1 mg (1 mg Oral Given 6/8/19 1758)      Family present during ED course? No  Family currently present? No    BACKGROUND  Does the patient have a cognitive impairment or developmental disability? No  Allergies:   Allergies   Allergen Reactions     Azithromycin Hives   .   Social demographics are   Social History     Socioeconomic History     Marital status:      Spouse name: None     Number of children: None     Years of education: None     Highest education level: None   Occupational History     None   Social Needs     Financial resource strain: None     Food insecurity:     Worry: None     Inability: None     Transportation needs:     Medical: None     Non-medical: None   Tobacco Use     Smoking status: Current Every Day Smoker   Substance and Sexual Activity     Alcohol use: Yes     Drug use: No     Sexual activity: None   Lifestyle     Physical activity:     Days per week: None     Minutes per session: None     Stress: None   Relationships     Social connections:     Talks on phone: None     Gets together: None     Attends Episcopal service: None     Active member of club or organization: None     Attends meetings of clubs or organizations: None     Relationship status: None     Intimate partner violence:     Fear of current or ex partner: None     Emotionally abused: None     Physically abused: None     Forced sexual activity: None   Other Topics Concern     None   Social History Narrative     None        ASSESSMENT  Labs results   Labs Ordered and Resulted from Time of ED Arrival Up to the Time of Departure from the ED   CBC WITH PLATELETS DIFFERENTIAL - Abnormal; Notable for the following components:       Result Value    Hemoglobin 10.3 (*)     Hematocrit 33.0 (*)     MCV 77 (*)     MCH 24.0 (*)     MCHC 31.2 (*)     RDW 16.9 (*)     All other components within normal limits   COMPREHENSIVE METABOLIC PANEL - Abnormal; Notable for  the following components:    Potassium 3.2 (*)     Chloride 110 (*)     Carbon Dioxide 18 (*)     Glucose 113 (*)     Calcium 8.0 (*)     All other components within normal limits   ALCOHOL ETHYL - Abnormal; Notable for the following components:    Ethanol g/dL 0.21 (*)     All other components within normal limits   ROUTINE UA WITH MICROSCOPIC - Abnormal; Notable for the following components:    Mucous Urine Present (*)     All other components within normal limits   DRUG ABUSE SCREEN 6 CHEM DEP URINE ([facility]) - Abnormal; Notable for the following components:    Ethanol Qual Urine Positive (*)     All other components within normal limits   ALCOHOL BREATH TEST POCT - Abnormal; Notable for the following components:    Alcohol Breath Test 0.162 (*)     All other components within normal limits   INR   HCG QUALITATIVE URINE   MAGNESIUM   CRP INFLAMMATION   LIPASE   TROPONIN I      Imaging Studies: No results found for this or any previous visit (from the past 24 hour(s)).   Most recent vital signs /84   Pulse 116   Temp 98.1  F (36.7  C) (Oral)   Resp 16   SpO2 97%    Abnormal labs/tests/findings requiring intervention:---   Pain control: pt had none  Nausea control: pt had none    RECOMMENDATION  Are any infection precautions needed (MRSA, VRE, etc.)? No If yes, what infection? --  ---  Does the patient have mobility issues? independently. If yes, what device does the pt use? ---  ---  Is patient on 72 hour hold or commitment? No If on 72 hour hold, have hold and rights been given to patient? No  Are admitting orders written if after 10 p.m. ?N/A  Tasks needing to be completed:---     Gianna Lee   [facility]-- [id number] [phone] [facility]   [phone] [facility]

## 2019-06-09 NOTE — DISCHARGE INSTRUCTIONS
Behavioral Discharge Planning and Instructions  THANK YOU FOR CHOOSING THE [address]  [phone]    Summary: You were admitted to [facility] on 6/8/19 for detoxification from alcohol.  A medical exam was performed that included lab work. You have met with a  and opted to return home, attend meetings.  Please take care and make your recovery a daily priority, Mary Anne! It was a pleasure working with you and the entire treatment team here wishes you the very best in your recovery!     Recommendation: Follow all recommendations of your medical and mental health providers; take all medications as prescribed; consider obtaining a chemical dependency assessment and pursuing outpatient treatment (resources below).     Main Diagnoses:  Per Dr. Trinidad:  Alcohol use disorder, moderate  Generalized anxiety disorder    Major Treatments, Procedures and Findings:  You were treated for alcohol detoxification using the [facility] protocol and Valium.  You did not complete a chemical dependency assessment. You had labs drawn and those results were reviewed with you. Please take a copy of your lab work with you to your next primary care physician appointment.    Symptoms to Report:  If you experience more anxiety, confusion, sleeplessness, deep sadness or thoughts of suicide, notify your treatment team or notify your primary care physician. IF ANY OF THE SYMPTOMS YOU ARE EXPERIENCING ARE A MEDICAL EMERGENCY CALL 911 IMMEDIATELY.     Lifestyle Adjustment: Adjust your lifestyle to get enough sleep, relaxation, exercise and good nutrition. Continue to develop healthy coping skills to decrease stress and promote a sober living environment. Do not use mood altering substances including alcohol, illegal drugs or addictive medications other than what is currently prescribed.     Disposition: Return home, attend AA meetings, follow-up with [facility] providers    Follow-up Appointment:  "  Psychiatrist/Primary Care Giver: Ubaldo Restrepo PA-C @ [facility]      Address: [address] [place], [place] [id number]      Phone: [phone]    Fax: [phone]    Outpatient Programs  You have opted not to be referred to chemical dependency treatment at this time. If you decide that you would like an outpatient level of support, below are some options in your area you can pursue. For more options, use the https://findtreatment.[facility].gov/ website.   [facility]  About the program: \"Treatment programming for adults and adolescents utilizes an evidence-based, interactive curriculum that is easy to use and understand. Core programming also utilizes Motivational Interviewing, focus on Integrated Treatment, Stage Wise Treatment strategies, Dialectical Behavior Therapy skills education, and 12 step explorations. These strategies are delivered through interactive journaling, group sessions, educational lectures and guest speakers, and weekly individual counseling sessions.\"  Address: [address] [place] [address]      Phone: [phone]      [facility]  About the program: [facility] is an abstinence-based, gender-responsive, and trauma-informed outpatient treatment program for adult women experiencing substance use and mental health concerns. Outpatient treatment is designed for individuals who are able to address their behavioral health issues while living at home and continuing to work, attend school, and fulfill family responsibilities. Program services are available during both  morning and evening hours to accommodate individual schedules.  Address: [address] [place] [address]  Phone: [phone]    [facility]  About the program: All of our programs are designed to help patients understand the negative effects of alcohol and/or drugs on their lives and relationships. Patients " will work with their counselor to develop a personalized plan that outlines treatment goals. By working towards these goals, individuals will develop a positive self-image and learn the coping skills necessary to achieve and maintain recovery.   Address: [address] [facility], [address], [place] [address]  Phone: [phone]    [facility]  About the program: [facility] is a licensed adult outpatient chemical health program, providing safe, caring, confidential care in a [facility]. Here, we help men and women, ages 18 and older, with chemical dependency issues through multiple therapeutic treatments--tailored to every individual. Our program activities are designed to engage, motivate and educate each person on an individual level. We do this through group and personal therapy, educational films and lectures, skill building workshops and conferences for specific additional concerns.  Address: [address] [place] [address], [facility] [address], [place] [address]  Phone: [phone]    Resources:   AA/NA and Sponsors are excellent resources for support and you can find one at any support group meeting.   http://www.aastpaul.org (then click meetings) This for the [facility] AA meetings  http://a[place].org/meetings/   This is for the [facility] AA meetings  http://www.[place]. (then click find a  Meeting) This is for [facility] NA   SMART Recovery - self management for addiction recovery:  www.smartrecovery.org  Pathways ~ A Health Crisis Resource & Support Center:  829.434.5902.  https://prescribetoprevent.org/patient-education/videos/  http://www.harmreduction.org  [facility] [phone]  Support Group:  AA/NA and Sponsor/support.  National [place] on Mental Illness (www.[place].govind.org): 305.721.3468 or 637-503-5044.  Alcoholics Anonymous (www.alcoholics-anonymous.org): Check your phone book for your local chapter.  Suicide Awareness  Voices of Education (SAVE) (www.save.org): 989-125-CLGB (7283)  National Suicide Prevention Line (www.mentalhealthmn.org): 807-946-QZHV (9242)  Mental Health Consumer/Survivor Network of [place] (www.mhcsn.net): 993.151.9379 or 999-369-4572  Mental Health Association of [place] (www.mentalhealth.org): 578.442.3428 or 558-029-5028   Substance Abuse and Mental Health Services (www.samhsa.gov)    [place] Recovery Connection ([facility])  [facility] connects people seeking recovery to resources that help foster and sustain long-term recovery.  Whether you are seeking resources for treatment, transportation, housing, job training, education, health care or other pathways to recovery, [facility] is a great place to start.  [phone].  www.[facility].org    General Medication Instructions:   See your medication sheet(s) for instructions.   Take all medications as prescribed.  Make no changes unless your doctor suggests them.   Go to all your doctor visits.  Be sure to have all your required lab tests. This way, your medicines can be refilled on time.  Do not use any forms of alcohol.    Please Note:  If you have any questions at anytime after you are discharged please call the [facility] [place] detox unit 3AW unit at [phone].  [facility], Behavioral Intake [phone]  Medical Records call [phone]  Outpatient Behavioral Intake call [phone]  [facility]+ Wait List/Bed Availability call [phone]    Please take this discharge folder with you to all your follow up appointments, it contains your lab results, diagnosis, medication list and discharge recommendations.      THANK YOU FOR CHOOSING THE [facility]

## 2019-06-09 NOTE — H&P
"[facility]  Psychiatric History and Physical      Patient name: Mary Anne Angel   MRN: [id number]    Age: 48 year old    YOB: 1970    Identifying information:   The patient is a 48 year old   female who is employed as a .    Chief complaint:  \"I think last night was the worst of it.  I have not had any Valium so far.\"    History of present illness: The patient is a history of alcohol use disorder who presented voluntarily to the emergency room seeking detox from alcohol.  She had reported drinking approximately 750 mL of vodka on a daily basis for at least the past 3 weeks.  Her urine drug screen was positive for alcohol and her blood alcohol level was 0.21.  She was agreeable for voluntary admission.  On examination today, she explains that she last completed outpatient treatment through [facility] in the spring 2018 and again in the summer 2018.  She was able to maintain a couple of months of sobriety however gradually reintroduced alcohol and has progressively increased her usage since.  She has been attempting to reduce her alcohol usage on her own however has been fearful of experiencing a seizure although she has not experienced one in the past.  Due to this concern, she decided to seek detox by presenting to the emergency room.  She is surprised that she has not experienced withdrawal symptoms so far and is contemplating going home later this evening if she does not require Valium.  She denied any significant psychosocial stressors and talked positively about her social support network outside hospital.    Psychiatric Review of Systems:    -- Depressive episode: Denied symptoms  -- Valerie:  denies symptoms  -- Psychosis:  denies symptoms  -- Anxiety: denies symptoms corresponding to SANCHO or panic disorder  -- PTSD: denies symptoms  -- OCD: denies  symptoms  -- Eating disorder: denies symptoms    Psychiatric History:    She " "receives outpatient mental health services through [facility] where she is prescribed Wellbutrin for mood management, as needed gabapentin for management of anxiety, and Antabuse for management of her alcohol use disorder.  She denied inpatient mental health treatment.  She denied prior suicide attempts.    Substance Use History:    Drug of choice: Alcohol as described above, typically drinking up to 750 mL of vodka on a daily basis.  Pattern of Use: continual; solitary  Consequences of use: functional, interpersonal  Tolerance/withdrawl: She endorsed tolerance, withdrawal symptoms, progressive usage, and loss of control over usage.  Prior treatments: Through past treatments, she was able to maintain up to 6 years of sobriety.  Prior treatments noted many years ago through our facility as well as through [facility], most recently 2 times last year in the spring and again in the summer.    Medical History: No active issues      No current facility-administered medications on file prior to encounter.   Current Outpatient Medications on File Prior to Encounter:  disulfiram (ANTABUSE) 250 MG tablet Take 250 mg by mouth daily   buPROPion (WELLBUTRIN XL) 150 MG 24 hr tablet Take 150 mg by mouth every morning   gabapentin (NEURONTIN) 100 MG capsule Take 100-200 mg by mouth 2 times daily as needed        Social History:  Refer to the psychosocial assessment completed by our .     Family History:    Prominent chemical addiction in the family.  No knowledge of bipolar disorder or suicides.    Medical review of systems: 10 systems were reviewed and positive for psychiatric symptoms as noted above otherwise negative    Physical Exam:    B/P: 130/79, T: 98.2, P: 78, R: 16  Estimated body mass index is 23.63 kg/m  as calculated from the following:    Height as of this encounter: 1.753 m (5' 9\").    Weight as of this encounter: 72.6 kg (160 lb).    The rest of the physical examination was reviewed in the " "emergency room note completed by the emergency room physician.      Mental status examination:  Appearance:  Alert, fair hygiene, no acute distress  Attitude:  Attempts to be cooperative  Eye Contact: Fair  Mood: \"Fine\"  Affect: She appeared bright and pleasant.  Speech:  Normal rates, tone, latency, volume. Not pushed or pressured.  Psychomotor Behavior:  No psychomotor abnormalities noted  Thought Process: Linear and logical; not tangential or circumstantial or disorganized  Associations:  Logical; no loose associations Noted  Thought Content:  No obvious paranoia, delusions, ideas of reference, or grandiosity noted. Denies auditory or visual hallucinations. Denies suicidal Ideations. Denies homicidal ideations.  Insight:  Fair  Judgment:  Fair  Oriented to:  time, person, and place  Attention Span and Concentration:  Intact  Recent and Remote Memory: Intact based on interviewing and details provided  Language: Appropriate based on interviewing  Fund of Knowledge: Appropriate based on interviewing  Muscle Strength and Tone: Normal upon visual inspection  Gait and Station: Normal upon visual inspection            Diagnoses:    Alcohol use disorder, moderate  Generalized anxiety disorder     Plan:  1.  The patient has been admitted to [facility] to detox from alcohol voluntarily.  She is considering discharge home later this afternoon if not requiring Valium for management of withdrawal.  Otherwise, her primary team will resume her care tomorrow morning if she elects to continue her hospitalization.    2.  Continue to monitor for alcohol withdrawal symptoms under an MSSA protocol with Valium as needed.  Her vital signs look fairly stable and she has not yet required Valium.  She was educated regarding the possibility of a delayed withdrawal syndrome.  Wellbutrin has been held during the detox process to avoid complications.  She plans to resume both Wellbutrin and Antabuse after completion of detox.  She will continue " gabapentin for management of anxiety as needed.    3. Psychosocial treatments to be addressed with social work consult and groups.

## 2019-06-09 NOTE — PROGRESS NOTES
Pt is being monitored for alcohol withdrawal. Pt had MSSA scores of 9 and 10 this shift. Pt is refusing valium at this time. Discussed role of valium in safe detox to prevent seizure. Pt states she has never had a seizure and states if she does not take the valium then if she wants to leave she can. Writer discussed with patient if that she is symptomatic enough to need valium that even if she is refusing valium the provider likely would not discharge her as it is not safe, pt encouraged to take medication as appropriate. Pt continued to refuse. Will continue to monitor.  Meggan Lindsay RN

## 2019-06-09 NOTE — CONSULTS
Internal Medicine Initial Visit      Mary Anne Angel MRN# [id number]   YOB: 1970 Age: 48 year old   Date of Admission: 6/8/2019  PCP: [facility], [place]    Referring Provider: Behavioral Health - Rolando Edmonds MD  Reason for Visit: Co-management of alcohol withdrawal          Assessment and Recommendations:   Mary Anne Angel is a 48 year old woman with a history of alcohol dependence, previously diagnosed with lupus (not on any therapy), and anxiety who is admitted to station 3A for alcohol withdrawal. Internal Medicine consultation was ordered by Dr. Rolando Edmonds for co-management of alcohol withdrawal.     Alcohol dependence with acute withdrawal: Pt reports a hx of alcohol abuse since 1/2017. Has been to [place] x2 last year. Previously sober for 15 years prior to 2017. She has been drinking 700ml vodka per day. Last drink 1430 on 6/8. No acute signs or symptoms of withdrawal at this time. No hx of DYLAN or DTs. Refused Valium last evening.   -Management per psychiatry team.     Anxiety: On PTA Gabapentin for hx of anxiety. States anxiety started when she relapsed. Takes as needed up to 1200mg per day, but usually states it is more like 300mg daily. Previously on Wellbutrin 150mg daily to help with smoking cessation; no longer taking.   -Management per psychiatry team.     Atypical chest pain: In ED, patient had reported chest pain, ear pain, numbness in leg and anxiety which she attributes to her symptoms of withdrawal. Pt reports hx of chest pain and points to up and down her esophagus when she has the pain or it radiates to left chest. She states this has been going on for past 1 year and states it feels like it is anxiety. She denies GERD sx. She states the pain is sporadic and is not correlated with exertion. No diaphoresis, N/V. Pain will last 30min-1hr. Goes away on its own. Gabapentin helps with the pain, anxiety makes it worse. Not reproducible when it comes on. In ED, ECG  with sinus tachycardia with incomplete RBBB. Repeat ECG today with NSR; no e/o RBBB. No acute ST-T wave changes or ischemic changes. Troponin in ED negative. No current chest pain. VSS. No hx of MI or heart disease. No family hx of heart disease. Smokes 1/2ppd for 38years. Low suspicion for ACS at this time. Will start PPI for any underlying acid reflux. Will have patient follow-up closely with PCP for further work-up. Case was discussed with Dr. Ubaldo Jimenez, Internal Medicine who agrees with plan.   -Repeated ECG this AM; NSR w/o e/o RBBB  -Please notify internal medicine if patient has any recurrent chest pain  -Start Omeprazole 20mg daily for possible underlying acid reflux  -Patient to follow-up closely with PCP for further work-up.   -Warning signs and symptoms were discussed to return to ED including but not limited to new or change in chest pain, SOB, dizziness, lightheadedness.     Chronic right shoulder pain: Pt reports chronic right sided shoulder pain x1 year. Pain is reproducible at right supraspinatus musculature. FROM of the right shoulder. No paresthesias and neurovascularly intact. She states she was lifting weights when the pain occurred. Has not followed up. Likely secondary to impingement syndrome, tendonitis vs intraarticular pain such as labral tear etc. Low suspicion for rotator cuff tear given FROM. No e/o swelling warmth or erythema. No bony abnormality and no acute injury, therefore will defer XR at this time.   -At this time, given the chronicity of shoulder pain, recommend follow-up with PCP for consideration of XR +/- MRI if warranted and PT.   -PCP to refer to orthopedics based on their discretion.   -RICE therapy.     Hx of lung nodule: Seen on CT abd/pelvis on 10/2011. There was noted to be a 2-3 mm left lower lobe micronodule. Recommended a follow-up CT at 12 months; if unchanged, no further   follow-up. It appears per care everywhere a repeat CT was not performed.  -Follow-up with  PCP to discuss repeat CT chest      Hx of Lupus: Pt reports previous dx of Lupus and previously Rx Plaquenil (has not taken since ~2011). She used to follow with Rheumatologist, however, no longer follows. She reports last seeing Rheum 2011. She states that her inflammatory markers improved and did not need to be on medications at that time. She states that she is at risk for development for lupus but not currently a diagnosis. No acute concerns.   -Follow-up with PCP and Rheumatology as needed   Hypocalcemia: Ca 8.0 which was corrected w/ albumin to 8.4. Will obtain Vit D level. Continue MVI. Encourage dietary calcium. Vit D level pending.   Medicine will continue to follow, please page with any additional concerns.     Kerne Jerez PA-C  Hospitalist Service   Pager: [phone]     Reason for Admission:   Alcohol withdrawal           History of Present Illness:   History is obtained from the patient and medical record.     Mary Anne Angel is a 48 year old woman with a history of alcohol dependence, previously diagnosed with lupus (not on any therapy), and anxiety who is admitted to station 3A for alcohol withdrawal. Internal Medicine consultation was ordered by Dr. Rolando Edmonds for co-management of alcohol withdrawal.     Pt reports a hx of alcohol abuse since 1/2017. Has been to [employer] x2 last year. Previously sober for 15 years prior to 2017. She has been drinking 700ml vodka per day after relapsing 3 weeks ago. Last drink 1430 on 6/8.      In ED she reported anxiety, tremulousness, numbness in feet and chest pain and ear pain when she withdrawals. ECG with sinus tachycardia with incomplete RBBB with no ST-T wave changes or acute ischemic changes. Troponin negative. CBC with Hgb 10.3, hematocrit 33, MCV 77, CMP with K 3.2, lipase 79. She was given 1L bolus of NS.     Patient reports a hx of chest pain for 1 year that she attributes to alcohol withdrawal and anxiety. Pt reports hx of chest pain and  "points to up and down her esophagus when she has the pain or it radiates to left chest. She denies GERD sx. She states the pain is sporadic and is not correlated with exertion. No diaphoresis, N/V/Diaphoresis. Pain will last 30min-1hr. Goes away on its own. Gabapentin helps with the pain, anxiety makes it worse. Not reproducible when it comes on. She denies any hx of MI, CAD, HLD, HTN or T2DM. She does smoke 1/2 ppd for 38years. No family hx of heart disease.     Pt reports previous dx of Lupus and previously Rx Plaquenil (has not taken since ~2011). She used to follow with Rheumatologist, however, no longer follows. She reports last seeing Rheum 2011. In 2011, she states she received a letter for her Rheumatologist saying her inflammatory markers had improved and did not need to be on medications at that time and did not have lupus. She states that she is at risk for development for lupus but not currently a diagnosis. No acute concerns or new joint aches or pains.     Pt reports chronic right sided shoulder pain x1 year. She states she was lifting weights when the pain occurred and felt a \"pop.\" She states the pain comes on and off. No paresthesias or weakness. She has not followed up with her PCP. No swelling, warmth or pain. The pain is reproducible to upper shoulder per patient. No other acute concerns at this time.         Review of Systems:    ROS: 10 point ROS neg other than the symptoms noted above in the HPI.          Past Medical History:   Reviewed and updated in Epic.  Past Medical History:   Diagnosis Date     Arthritis      Lupus      Lupus              Past Surgical History:   Reviewed and updated in Epic.  No past surgical history on file.          Social History:   She is a special . Lives in a town home with  and two twin 15yo sons in [place].   Social History     Tobacco Use     Smoking status: Current Every Day Smoker   Substance Use Topics     Alcohol use: Yes     Drug use: " "No             Family History:   Reviewed and updated in Epic.  Family History   Problem Relation Age of Onset     Chronic Obstructive Pulmonary Disease Mother      Chronic Obstructive Pulmonary Disease Father      Stomach Cancer Paternal Grandmother              Allergies:     Allergies   Allergen Reactions     Azithromycin Hives             Medications:     Medications Prior to Admission   Medication Sig Dispense Refill Last Dose     disulfiram (ANTABUSE) 250 MG tablet Take 250 mg by mouth daily   Past Month at Unknown time     buPROPion (WELLBUTRIN XL) 150 MG 24 hr tablet Take 150 mg by mouth every morning   More than a month at Unknown time     gabapentin (NEURONTIN) 100 MG capsule Take 100-200 mg by mouth 2 times daily as needed   More than a month at Unknown time        Current Facility-Administered Medications   Medication     acetaminophen (TYLENOL) tablet 650 mg     alum & mag hydroxide-simethicone (MYLANTA ES/MAALOX  ES) suspension 30 mL     atenolol (TENORMIN) tablet 50 mg     diazepam (VALIUM) tablet 5-20 mg     folic acid (FOLVITE) tablet 1 mg     gabapentin (NEURONTIN) capsule 100-200 mg     hydrOXYzine (ATARAX) tablet 25 mg     magnesium hydroxide (MILK OF MAGNESIA) suspension 30 mL     multivitamin w/minerals (THERA-VIT-M) tablet 1 tablet     nicotine (NICODERM CQ) 21 MG/24HR 24 hr patch 1 patch     nicotine Patch in Place     nicotine patch REMOVAL     [START ON 6/10/2019] omeprazole (priLOSEC) CR capsule 20 mg     ondansetron (ZOFRAN-ODT) ODT tab 4 mg     traZODone (DESYREL) tablet 50 mg     vitamin B1 (THIAMINE) tablet 100 mg            Physical Exam:   Blood pressure 130/79, pulse 78, temperature 98.2  F (36.8  C), temperature source Oral, resp. rate 16, height 1.753 m (5' 9\"), weight 72.6 kg (160 lb), SpO2 99 %.  Body mass index is 23.63 kg/m .  GENERAL: Alert and oriented x 3. No acute distress.   HEENT: Anicteric sclera. Mucous membranes moist.   CV: RRR. S1, S2. No murmurs appreciated. "   RESPIRATORY: Effort normal on room air. Lungs CTAB with no wheezing, rales, rhonchi.   GI: Abdomen soft, non distended, non tender. No guarding, rigidity or rebound tenderness.  MUSCULOSKELETAL: No joint swelling. Right shoulder with reproducible tenderness to supraspinatus musculature. No obvious deformity. FROM of the bilateral shoulders. Strength 5/5. Sensation intact in BLE. Radial pulse 2+. No overlying erythema or warmth to right shoulder. Cervical spine w/out tenderness. Elbow normal.    NEUROLOGICAL: No focal deficits. Moves all extremities.   EXTREMITIES: No peripheral edema. Intact bilateral pedal pulses. No calf tenderness.    SKIN: No jaundice. No rashes.           Data:   CBC:  Recent Labs   Lab Test 06/08/19  1735   WBC 4.6   RBC 4.30   HGB 10.3*   HCT 33.0*   MCV 77*   MCH 24.0*   MCHC 31.2*   RDW 16.9*          CMP:  Recent Labs   Lab Test 06/08/19  1735      POTASSIUM 3.2*   CHLORIDE 110*   ANTONIETTA 8.0*   CO2 18*   BUN 10   CR 0.77   *   AST 18   ALT 19   BILITOTAL 0.5   ALBUMIN 3.5   PROTTOTAL 7.2   ALKPHOS 62       TSH:  TSH   Date Value Ref Range Status   06/09/2019 1.34 0.40 - 4.00 mU/L Final       Tox screen: positive for ethanol  Ethanol level: 0.21   Alcohol breath test: 0.162  INR: 1.03    Unresulted Labs Ordered in the Past 30 Days of this Admission     Date and Time Order Name Status Description    6/9/2019 0716 Vitamin D Deficiency In process

## 2019-06-10 LAB
DEPRECATED CALCIDIOL+CALCIFEROL SERPL-MC: 23 UG/L (ref 20–75)
INTERPRETATION ECG - MUSE: NORMAL

## 2019-06-10 NOTE — PLAN OF CARE
Patient discharged to home via ambulatory. Patient denies suicidal ideations and has adequate discharge plans in place. Discharge instructions were reviewed with patient and patient verbalized understanding. Belongings returned at time of discharge. Patient denies any further concerns.

## 2019-06-10 NOTE — PROGRESS NOTES
06/09/19 2008   Therapeutic Recreation   Type of Intervention structured groups   Activity leisure education   Response Participates, initiates socially appropriate   Hours 1     Pt participated in Therapeutic Recreation group with focus on stress reduction, leisure education, and acquisition of knowledge and skills. Pt was fully engaged and cooperative in group activity creating a collaborative leisure inventory. Pt participated through the entire duration of the group. Pt discussed many healthy interests enjoyed during free time during group discussion. Showed progress in session goals. Pt mood was calm and was appropriate with interactions.

## 2019-06-15 NOTE — DISCHARGE SUMMARY
[facility]  Department of Psychiatry    DATE OF ADMISSION:  6/8/2019    DATE OF DISCHARGE:  6/9/2019    DISCHARGE DIAGNOSES:   Alcohol use disorder, moderate  Generalized anxiety disorder    HOSPITAL COURSE: (Refer to H&P, progress notes, and consult notes for details)    The patient was admitted to unit 3A to detox from alcohol voluntarily.  MSSA protocol was initiated with Valium as needed for management of alcohol withdrawal symptoms.  Wellbutrin was held along with Antabuse during the detox phase.  She completed detox without complications and did not require Valium.  Noting completion of her detox, she requested to be discharged home.  Her care was then transitioned to outpatient providers.  She plan to restart Wellbutrin and Antabuse after returning home.    Other interventions received during his hospitalization included:   Psychosocial treatments were addressed with groups, social work consult, and supportive milieu provided by staff.    CONDITION AT DISCHARGE:  Improved.  The patients acute suicide risk is low due to the following factors:  improved mood/anxiety symptoms.  Denies suicidal ideations. Denies psychotic symptoms.  Not actively intoxicated and plans to abstain from illicit substances and alcohol.  Denies access to guns.  Denies feeling hopeless or helpless. At the time of discharge Mary Anne Angel was determined to not be an immediate danger to herself or others. The patient's acute risk will be higher if noncompliant with treatment plan, medications, follow-up or using illicit substances or alcohol.  These findings along with the risks of noncompliance with medications and treatment plan, which could potentially cause decompensation and increase the risk for suicide, were discussed with the patient.  The patients chronic suicide risk is low given the following factors: history of chemical dependency; Denied a family history of suicide.  Preventative  factors include: social supports, stable housing     MENTAL STATUS EXAMINATION AT TIME OF DISCHARGE:  The patient is 48 year old  female who appears their stated age and is appropriately dressed with good hygiene.  Calm and cooperative with the interview questions.  No psychomotor abnormalities are noted. Eye contact is appropriate. Speech has normal rate, tone, latency and volume and is not pushed or pressured. Mood is euthymic and affect is full and appropriate.  The patient does not seem overtly depressed, anxious, manic or irritable.  Thought process is linear, logical and future oriented.  Thought process is not tangential, circumstantial or disorganized.  Thought content is not significant for apperant paranoia, delusions, ideas of reference or grandiosity.  The patient denies suicidal and homicidal ideations as well as auditory and visual hallucinations.  Insight and judgment are fair.  Cognition appears intact to interviewing including orientation, recent and remote memory, fund of knowledge, use of language, attention span and concentration.  Muscle strength, tone and gait appear normal on visual inspection.      DISPOSITION:  The patient is discharged home     FOLLOWUP APPOINTMENTS:  ( per social workers notes and after visit summary)  Attend AA meetings, follow-up with [facility] providers  Psychiatrist/Primary Care Giver: Ubaldo Restrepo PA-C @ [facility]      Address: [address] [place] [address], [address], [place] [address]      Phone: [phone]      DISCHARGE MEDICATIONS:   Discharge Medication List as of 6/9/2019  6:55 PM      CONTINUE these medications which have NOT CHANGED    Details   buPROPion (WELLBUTRIN XL) 150 MG 24 hr tablet Take 150 mg by mouth every morning, Historical      disulfiram (ANTABUSE) 250 MG tablet Take 250 mg by mouth daily, Historical      gabapentin (NEURONTIN) 100 MG capsule Take 100-200 mg by mouth 2 times daily as needed, Historical               LABORATORY RESULTS: (past 14 days)  Recent Results (from the past 336 hour(s))   Alcohol breath test POCT    Collection Time: 06/08/19  4:27 PM   Result Value Ref Range    Alcohol Breath Test 0.162 (A) 0.00 - 0.01   EKG 12 lead    Collection Time: 06/08/19  4:33 PM   Result Value Ref Range    Interpretation ECG Click View Image link to view waveform and result    CBC with platelets differential    Collection Time: 06/08/19  5:35 PM   Result Value Ref Range    WBC 4.6 4.0 - 11.0 10e9/L    RBC Count 4.30 3.8 - 5.2 10e12/L    Hemoglobin 10.3 (L) 11.7 - 15.7 g/dL    Hematocrit 33.0 (L) 35.0 - 47.0 %    MCV 77 (L) 78 - 100 fl    MCH 24.0 (L) 26.5 - 33.0 pg    MCHC 31.2 (L) 31.5 - 36.5 g/dL    RDW 16.9 (H) 10.0 - 15.0 %    Platelet Count 285 150 - 450 10e9/L    Diff Method Automated Method     % Neutrophils 62.7 %    % Lymphocytes 29.4 %    % Monocytes 6.9 %    % Eosinophils 0.6 %    % Basophils 0.2 %    % Immature Granulocytes 0.2 %    Nucleated RBCs 0 0 /100    Absolute Neutrophil 2.9 1.6 - 8.3 10e9/L    Absolute Lymphocytes 1.4 0.8 - 5.3 10e9/L    Absolute Monocytes 0.3 0.0 - 1.3 10e9/L    Absolute Eosinophils 0.0 0.0 - 0.7 10e9/L    Absolute Basophils 0.0 0.0 - 0.2 10e9/L    Abs Immature Granulocytes 0.0 0 - 0.4 10e9/L    Absolute Nucleated RBC 0.0    Comprehensive metabolic panel    Collection Time: 06/08/19  5:35 PM   Result Value Ref Range    Sodium 141 133 - 144 mmol/L    Potassium 3.2 (L) 3.4 - 5.3 mmol/L    Chloride 110 (H) 94 - 109 mmol/L    Carbon Dioxide 18 (L) 20 - 32 mmol/L    Anion Gap 13 3 - 14 mmol/L    Glucose 113 (H) 70 - 99 mg/dL    Urea Nitrogen 10 7 - 30 mg/dL    Creatinine 0.77 0.52 - 1.04 mg/dL    GFR Estimate >90 >60 mL/min/[1.73_m2]    GFR Estimate If Black >90 >60 mL/min/[1.73_m2]    Calcium 8.0 (L) 8.5 - 10.1 mg/dL    Bilirubin Total 0.5 0.2 - 1.3 mg/dL    Albumin 3.5 3.4 - 5.0 g/dL    Protein Total 7.2 6.8 - 8.8 g/dL    Alkaline Phosphatase 62 40 - 150 U/L    ALT 19 0 - 50 U/L     AST 18 0 - 45 U/L   INR    Collection Time: 06/08/19  5:35 PM   Result Value Ref Range    INR 1.03 0.86 - 1.14   Alcohol ethyl    Collection Time: 06/08/19  5:35 PM   Result Value Ref Range    Ethanol g/dL 0.21 (H) <0.01 g/dL   Magnesium    Collection Time: 06/08/19  5:35 PM   Result Value Ref Range    Magnesium 2.0 1.6 - 2.3 mg/dL   CRP inflammation    Collection Time: 06/08/19  5:35 PM   Result Value Ref Range    CRP Inflammation <2.9 0.0 - 8.0 mg/L   Lipase    Collection Time: 06/08/19  5:35 PM   Result Value Ref Range    Lipase 79 73 - 393 U/L   Troponin I    Collection Time: 06/08/19  5:35 PM   Result Value Ref Range    Troponin I ES <0.015 0.000 - 0.045 ug/L   UA with Microscopic    Collection Time: 06/08/19  5:36 PM   Result Value Ref Range    Color Urine Light Yellow     Appearance Urine Clear     Glucose Urine Negative NEG^Negative mg/dL    Bilirubin Urine Negative NEG^Negative    Ketones Urine Negative NEG^Negative mg/dL    Specific Gravity Urine 1.006 1.003 - 1.035    Blood Urine Negative NEG^Negative    pH Urine 5.0 5.0 - 7.0 pH    Protein Albumin Urine Negative NEG^Negative mg/dL    Urobilinogen mg/dL Normal 0.0 - 2.0 mg/dL    Nitrite Urine Negative NEG^Negative    Leukocyte Esterase Urine Negative NEG^Negative    Source Midstream Urine     WBC Urine 0 0 - 5 /HPF    RBC Urine 1 0 - 2 /HPF    Squamous Epithelial /HPF Urine 1 0 - 1 /HPF    Mucous Urine Present (A) NEG^Negative /LPF   HCG qualitative urine    Collection Time: 06/08/19  5:36 PM   Result Value Ref Range    HCG Qual Urine Negative NEG^Negative   Drug abuse screen 6 urine (chem dep)    Collection Time: 06/08/19  5:36 PM   Result Value Ref Range    Amphetamine Qual Urine Negative NEG^Negative    Barbiturates Qual Urine Negative NEG^Negative    Benzodiazepine Qual Urine Negative NEG^Negative    Cannabinoids Qual Urine Negative NEG^Negative    Cocaine Qual Urine Negative NEG^Negative    Ethanol Qual Urine Positive (A) NEG^Negative    Opiates  Qualitative Urine Negative NEG^Negative   GGT    Collection Time: 06/09/19  7:16 AM   Result Value Ref Range    GGT 15 0 - 40 U/L   Lipid panel    Collection Time: 06/09/19  7:16 AM   Result Value Ref Range    Cholesterol 130 <200 mg/dL    Triglycerides 40 <150 mg/dL    HDL Cholesterol 79 >49 mg/dL    LDL Cholesterol Calculated 43 <100 mg/dL    Non HDL Cholesterol 51 <130 mg/dL   TSH with free T4 reflex and/or T3 as indicated    Collection Time: 06/09/19  7:16 AM   Result Value Ref Range    TSH 1.34 0.40 - 4.00 mU/L   Vitamin B12    Collection Time: 06/09/19  7:16 AM   Result Value Ref Range    Vitamin B12 412 193 - 986 pg/mL   Basic metabolic panel    Collection Time: 06/09/19  7:16 AM   Result Value Ref Range    Sodium 139 133 - 144 mmol/L    Potassium 3.9 3.4 - 5.3 mmol/L    Chloride 109 94 - 109 mmol/L    Carbon Dioxide 21 20 - 32 mmol/L    Anion Gap 9 3 - 14 mmol/L    Glucose 86 70 - 99 mg/dL    Urea Nitrogen 10 7 - 30 mg/dL    Creatinine 0.74 0.52 - 1.04 mg/dL    GFR Estimate >90 >60 mL/min/[1.73_m2]    GFR Estimate If Black >90 >60 mL/min/[1.73_m2]    Calcium 8.1 (L) 8.5 - 10.1 mg/dL   Magnesium    Collection Time: 06/09/19  7:16 AM   Result Value Ref Range    Magnesium 2.0 1.6 - 2.3 mg/dL   Vitamin D Deficiency    Collection Time: 06/09/19  7:16 AM   Result Value Ref Range    Vitamin D Deficiency screening 23 20 - 75 ug/L   EKG 12-lead, complete    Collection Time: 06/09/19 10:02 AM   Result Value Ref Range    Interpretation ECG Click View Image link to view waveform and result        >30 minutes was spent on this discharge to allow for reviewing the patient's response to treatment, reviewing plan of care, education on medications and diagnosis, and conducting a risk assessment.

## 2019-09-30 ENCOUNTER — HEALTH MAINTENANCE LETTER (OUTPATIENT)
Age: 49
End: 2019-09-30

## 2019-11-25 LAB — INTERPRETATION ECG - MUSE: NORMAL

## 2020-02-25 ENCOUNTER — OFFICE VISIT (OUTPATIENT)
Dept: INTERNAL MEDICINE | Facility: CLINIC | Age: 50
End: 2020-02-25
Payer: COMMERCIAL

## 2020-02-25 VITALS
BODY MASS INDEX: 23.55 KG/M2 | SYSTOLIC BLOOD PRESSURE: 92 MMHG | TEMPERATURE: 100.5 F | RESPIRATION RATE: 22 BRPM | HEIGHT: 69 IN | WEIGHT: 159 LBS | OXYGEN SATURATION: 100 % | HEART RATE: 94 BPM | DIASTOLIC BLOOD PRESSURE: 64 MMHG

## 2020-02-25 DIAGNOSIS — R50.9 FEVER, UNSPECIFIED FEVER CAUSE: ICD-10-CM

## 2020-02-25 DIAGNOSIS — N93.9 EXCESSIVE VAGINAL BLEEDING: ICD-10-CM

## 2020-02-25 DIAGNOSIS — R52 BODY ACHES: Primary | ICD-10-CM

## 2020-02-25 DIAGNOSIS — N92.6 IRREGULAR BLEEDING: ICD-10-CM

## 2020-02-25 LAB
FLUAV+FLUBV AG SPEC QL: NEGATIVE
FLUAV+FLUBV AG SPEC QL: NEGATIVE
SPECIMEN SOURCE: NORMAL

## 2020-02-25 PROCEDURE — 87804 INFLUENZA ASSAY W/OPTIC: CPT | Performed by: NURSE PRACTITIONER

## 2020-02-25 PROCEDURE — 99203 OFFICE O/P NEW LOW 30 MIN: CPT | Performed by: NURSE PRACTITIONER

## 2020-02-25 RX ORDER — SULFAMETHOXAZOLE/TRIMETHOPRIM 800-160 MG
1 TABLET ORAL
COMMUNITY
Start: 2020-02-22 | End: 2020-03-11

## 2020-02-25 RX ORDER — FERROUS SULFATE 325(65) MG
325 TABLET ORAL
COMMUNITY
End: 2021-04-03

## 2020-02-25 RX ORDER — CEFDINIR 300 MG/1
300 CAPSULE ORAL
COMMUNITY
Start: 2020-02-22 | End: 2020-03-11

## 2020-02-25 RX ORDER — OSELTAMIVIR PHOSPHATE 75 MG/1
75 CAPSULE ORAL 2 TIMES DAILY
Qty: 10 CAPSULE | Refills: 0 | Status: SHIPPED | OUTPATIENT
Start: 2020-02-25 | End: 2020-03-11

## 2020-02-25 ASSESSMENT — MIFFLIN-ST. JEOR: SCORE: 1415.6

## 2020-02-25 NOTE — PATIENT INSTRUCTIONS
Consider OB GYN to talk about hysterectomy with anemia and periods increasing    [facility]: [facility] [place] [phone]   [email or web address]      Tamiflu twice daily for 5 days

## 2020-02-25 NOTE — NURSING NOTE
"Chief Complaint   Patient presents with     Fever     pt c/o fever, body aches onset yesterday.     initial BP 92/64   Pulse 94   Temp 100.5  F (38.1  C) (Oral)   Resp 22   Ht 1.753 m (5' 9\")   Wt 72.1 kg (159 lb)   LMP 02/20/2020   SpO2 100%   Breastfeeding No   BMI 23.48 kg/m   Estimated body mass index is 23.48 kg/m  as calculated from the following:    Height as of this encounter: 1.753 m (5' 9\").    Weight as of this encounter: 72.1 kg (159 lb)..  bp completed using cuff size regular  VÍCTOR ROWLAND LPN  "

## 2020-02-25 NOTE — PROGRESS NOTES
".Subjective     Mary Anne Angel is a 49 year old female who presents to clinic today for the following health issues:    HPI   Chief Complaint   Patient presents with     Fever     pt c/o fever, body aches onset yesterday.  Pain in her body all night   This afternoon 101 fever - chills   stopped taking ibuprofen and tylenol to see where pain is and fever showed      had stroke 7 weeks ago   Right leg is out   Stressed     Eye is improved - pain is improved   Sinusitis    Taking bactrim and cefdinir     Anemia   Started iron supplement Sunday  Period is full of clots and heavy lately - sometimes monthly and then 2-3 weeks    4 pads in an hour in past day             Patient Active Problem List   Diagnosis     Alcohol abuse     History reviewed. No pertinent surgical history.    Social History     Tobacco Use     Smoking status: Current Every Day Smoker     Smokeless tobacco: Never Used   Substance Use Topics     Alcohol use: Yes     Family History   Problem Relation Age of Onset     Chronic Obstructive Pulmonary Disease Mother      Chronic Obstructive Pulmonary Disease Father      Stomach Cancer Paternal Grandmother              Reviewed and updated as needed this visit by Provider  Tobacco  Allergies  Meds  Problems  Med Hx  Surg Hx  Fam Hx         Review of Systems   ROS COMP: Constitutional, HEENT, cardiovascular, pulmonary, GI, , musculoskeletal, neuro, skin, endocrine and psych systems are negative, except as otherwise noted.      Objective    BP 92/64   Pulse 94   Temp 100.5  F (38.1  C) (Oral)   Resp 22   Ht 1.753 m (5' 9\")   Wt 72.1 kg (159 lb)   LMP 02/20/2020   SpO2 100%   Breastfeeding No   BMI 23.48 kg/m    Body mass index is 23.48 kg/m .  Physical Exam   GENERAL: alert and appears ill   EYES: periorbital cellulitis much improved   RESP: lungs clear to auscultation - no rales, rhonchi or wheezes  CV: regular rate and rhythm  MS: no gross musculoskeletal defects noted  NEURO: " Normal strength and tone, mentation intact and speech normal  PSYCH: mentation appears normal, affect normal/bright    Diagnostic Test Results:    Influenza neg         Assessment & Plan     1. Body aches  I am treating for flu even though flu test negative - for symptoms   - Influenza A/B antigen    2. Excessive vaginal bleeding  Needs to see GYN re options for excess bleeding and probably menopause induced   - OB/GYN REFERRAL    3. Irregular bleeding    - OB/GYN REFERRAL    4. Fever, unspecified fever cause    - oseltamivir (TAMIFLU) 75 MG capsule; Take 1 capsule (75 mg) by mouth 2 times daily for 5 days  Dispense: 10 capsule; Refill: 0     Tobacco Cessation:   reports that she has been smoking. She has never used smokeless tobacco.  Tobacco Cessation Action Plan: Information offered: Patient not interested at this time        Patient Instructions   Consider OB GYN to talk about hysterectomy with anemia and periods increasing    [facility]: [facility] [phone]   [email or web address]      Tamiflu twice daily for 5 days       Return in about 2 weeks (around 3/10/2020) for if not improved .    JAYDA Beck CNP  [facility]

## 2020-02-28 ENCOUNTER — OFFICE VISIT (OUTPATIENT)
Dept: INTERNAL MEDICINE | Facility: CLINIC | Age: 50
End: 2020-02-28
Payer: COMMERCIAL

## 2020-02-28 ENCOUNTER — NURSE TRIAGE (OUTPATIENT)
Dept: NURSING | Facility: CLINIC | Age: 50
End: 2020-02-28

## 2020-02-28 VITALS
BODY MASS INDEX: 23.36 KG/M2 | OXYGEN SATURATION: 100 % | DIASTOLIC BLOOD PRESSURE: 71 MMHG | TEMPERATURE: 98.9 F | WEIGHT: 157.7 LBS | HEIGHT: 69 IN | HEART RATE: 114 BPM | RESPIRATION RATE: 18 BRPM | SYSTOLIC BLOOD PRESSURE: 111 MMHG

## 2020-02-28 DIAGNOSIS — T78.40XA ALLERGIC REACTION, INITIAL ENCOUNTER: ICD-10-CM

## 2020-02-28 DIAGNOSIS — R21 RASH AND NONSPECIFIC SKIN ERUPTION: Primary | ICD-10-CM

## 2020-02-28 DIAGNOSIS — M32.9 SYSTEMIC LUPUS ERYTHEMATOSUS, UNSPECIFIED SLE TYPE, UNSPECIFIED ORGAN INVOLVEMENT STATUS (H): ICD-10-CM

## 2020-02-28 DIAGNOSIS — R50.9 FEVER, UNSPECIFIED FEVER CAUSE: ICD-10-CM

## 2020-02-28 LAB
ALBUMIN SERPL-MCNC: 3.3 G/DL (ref 3.4–5)
ALBUMIN UR-MCNC: NEGATIVE MG/DL
ALP SERPL-CCNC: 71 U/L (ref 40–150)
ALT SERPL W P-5'-P-CCNC: 57 U/L (ref 0–50)
ANION GAP SERPL CALCULATED.3IONS-SCNC: 7 MMOL/L (ref 3–14)
APPEARANCE UR: CLEAR
AST SERPL W P-5'-P-CCNC: 44 U/L (ref 0–45)
BASOPHILS # BLD AUTO: 0 10E9/L (ref 0–0.2)
BASOPHILS NFR BLD AUTO: 0.4 %
BILIRUB SERPL-MCNC: 0.6 MG/DL (ref 0.2–1.3)
BILIRUB UR QL STRIP: NEGATIVE
BUN SERPL-MCNC: 5 MG/DL (ref 7–30)
CALCIUM SERPL-MCNC: 8.8 MG/DL (ref 8.5–10.1)
CHLORIDE SERPL-SCNC: 105 MMOL/L (ref 94–109)
CO2 SERPL-SCNC: 23 MMOL/L (ref 20–32)
COLOR UR AUTO: YELLOW
CREAT SERPL-MCNC: 0.96 MG/DL (ref 0.52–1.04)
DIFFERENTIAL METHOD BLD: ABNORMAL
EOSINOPHIL # BLD AUTO: 0.1 10E9/L (ref 0–0.7)
EOSINOPHIL NFR BLD AUTO: 3.8 %
ERYTHROCYTE [DISTWIDTH] IN BLOOD BY AUTOMATED COUNT: 17.9 % (ref 10–15)
GFR SERPL CREATININE-BSD FRML MDRD: 70 ML/MIN/{1.73_M2}
GLUCOSE SERPL-MCNC: 123 MG/DL (ref 70–99)
GLUCOSE UR STRIP-MCNC: NEGATIVE MG/DL
HCT VFR BLD AUTO: 33.8 % (ref 35–47)
HGB BLD-MCNC: 10.5 G/DL (ref 11.7–15.7)
HGB UR QL STRIP: NEGATIVE
KETONES UR STRIP-MCNC: NEGATIVE MG/DL
LEUKOCYTE ESTERASE UR QL STRIP: NEGATIVE
LYMPHOCYTES # BLD AUTO: 0.5 10E9/L (ref 0.8–5.3)
LYMPHOCYTES NFR BLD AUTO: 19.6 %
MCH RBC QN AUTO: 25.2 PG (ref 26.5–33)
MCHC RBC AUTO-ENTMCNC: 31.1 G/DL (ref 31.5–36.5)
MCV RBC AUTO: 81 FL (ref 78–100)
MONOCYTES # BLD AUTO: 0.4 10E9/L (ref 0–1.3)
MONOCYTES NFR BLD AUTO: 14 %
NEUTROPHILS # BLD AUTO: 1.7 10E9/L (ref 1.6–8.3)
NEUTROPHILS NFR BLD AUTO: 62.2 %
NITRATE UR QL: NEGATIVE
PH UR STRIP: 5.5 PH (ref 5–7)
PLATELET # BLD AUTO: 237 10E9/L (ref 150–450)
POTASSIUM SERPL-SCNC: 3.6 MMOL/L (ref 3.4–5.3)
PROT SERPL-MCNC: 7.2 G/DL (ref 6.8–8.8)
RBC # BLD AUTO: 4.17 10E12/L (ref 3.8–5.2)
RBC #/AREA URNS AUTO: NORMAL /HPF
SODIUM SERPL-SCNC: 135 MMOL/L (ref 133–144)
SOURCE: NORMAL
SP GR UR STRIP: 1.01 (ref 1–1.03)
UROBILINOGEN UR STRIP-ACNC: 0.2 EU/DL (ref 0.2–1)
WBC # BLD AUTO: 2.7 10E9/L (ref 4–11)
WBC #/AREA URNS AUTO: NORMAL /HPF

## 2020-02-28 PROCEDURE — 81001 URINALYSIS AUTO W/SCOPE: CPT | Performed by: INTERNAL MEDICINE

## 2020-02-28 PROCEDURE — 86038 ANTINUCLEAR ANTIBODIES: CPT | Performed by: INTERNAL MEDICINE

## 2020-02-28 PROCEDURE — 85025 COMPLETE CBC W/AUTO DIFF WBC: CPT | Performed by: INTERNAL MEDICINE

## 2020-02-28 PROCEDURE — 99214 OFFICE O/P EST MOD 30 MIN: CPT | Performed by: INTERNAL MEDICINE

## 2020-02-28 PROCEDURE — 36415 COLL VENOUS BLD VENIPUNCTURE: CPT | Performed by: INTERNAL MEDICINE

## 2020-02-28 PROCEDURE — 82043 UR ALBUMIN QUANTITATIVE: CPT | Performed by: INTERNAL MEDICINE

## 2020-02-28 PROCEDURE — 80053 COMPREHEN METABOLIC PANEL: CPT | Performed by: INTERNAL MEDICINE

## 2020-02-28 PROCEDURE — 86225 DNA ANTIBODY NATIVE: CPT | Performed by: INTERNAL MEDICINE

## 2020-02-28 RX ORDER — PREDNISONE 10 MG/1
TABLET ORAL
Qty: 9 TABLET | Refills: 0 | Status: SHIPPED | OUTPATIENT
Start: 2020-02-28 | End: 2020-03-11

## 2020-02-28 ASSESSMENT — MIFFLIN-ST. JEOR: SCORE: 1409.7

## 2020-02-28 NOTE — PATIENT INSTRUCTIONS
Plan:  1. Zyrtec or Allegra 1 tablet daily  2. Stop the antibiotics  3. Prednisone 10 mg   -- day 1, 2,  3 take 20 mg daily in am  -- Day 4, 5, 6 take 10 mg daily  4. I suggest to make a follow up appointment with rheumatologist   5.  Labs today - [address]

## 2020-02-28 NOTE — NURSING NOTE
"/71 (BP Location: Right arm, Patient Position: Sitting, Cuff Size: Adult Regular)   Pulse 114   Temp 98.9  F (37.2  C) (Oral)   Resp 18   Ht 1.753 m (5' 9\")   Wt 71.5 kg (157 lb 11.2 oz)   LMP 02/20/2020   SpO2 100%   BMI 23.29 kg/m    Patient is being seen for hands and headache for 4 days. Patient also states she has a rash over her face and body and swollen lymph nodes.  "

## 2020-02-28 NOTE — LETTER
[facility]  Emergency Room  [address] [place]   [address]  Tel: [phone]   Fax: [phone]  2020    Mary Anne Angel  [address] [facility] [address]  [phone] (home)     : 1970          To Whom it May Concern:    Mary Anne Angel was seen in our clinic today, 2020. Please excuse her from work on  to .  She may return to work, without restriction, when improved.     Please contact me for questions or concerns.    Sincerely,      MD LILY Reyes, CMA

## 2020-02-28 NOTE — PROGRESS NOTES
Patient's instructions / PLAN:                                                        Plan:  1. Zyrtec or Allegra 1 tablet daily  2. Stop the antibiotics  3. Prednisone 10 mg   -- day 1, 2,  3 take 20 mg daily in am  -- Day 4, 5, 6 take 10 mg daily  4. I suggest to make a follow up appointment with rheumatologist   5.  Labs today - [address]     ASSESSMENT & PLAN:                                                        49 year old woman Dx with SLE in 2010. She managed to keep it under control with a holistic approach. Creat and CBC were in normal range on Feb 22. She recently develops a fever with face and eye lid inflammation, which was assessed as perioribital cellutilis. She was prescribed Omnicef + bactrim. Today, her eye lids are a little better, but she still has fever spikes and a diffuse rash on her skin. I think the diffuse rash is an allergic reaction to one of the Abx. The most recent ESR was normal, but I am worried that all these symptoms are in the SLE flare up context. We will recheck the Creat and urine to make sure that the kidneys are in normal range. I will treat her as as allergic reaction for now, and I strongly advised her to see her rheumatologist, Dr. Gonzalez.       (R21) Rash and nonspecific skin eruption  (primary encounter diagnosis)  Comment: as above   Plan: predniSONE (DELTASONE) 10 MG tablet            (M32.9) Systemic lupus erythematosus, unspecified SLE type, unspecified organ involvement status (H)  Comment: as above   Plan: Anti Nuclear Wendy IgG by IFA with Reflex, DNA         double stranded antibodies, UA with Microscopic        reflex to Culture, Albumin Random Urine         Quantitative with Creat Ratio, Comprehensive         metabolic panel, CBC with platelets         differential            (T78.40XA) Allergic reaction, initial encounter  Comment: as above   Plan:     (R50.9) Fever, unspecified fever cause  Comment: as above   Plan: Comprehensive metabolic panel, CBC with          platelets differential          Chief Complaint:                                                      Skin rash  Fever  Hands pain     SUBJECTIVE:                                                    History of present illness     She has history of lupus diagnosed in 2010, controlled with holistic approach as per patient.  She does not go for regular care to any traditional medical clinic, as far as I see in her chart.  She was evaluated at urgent care on February 22 for swollen eyes and diagnosed with periorbital cellulitis.  She was started on Omnicef and Bactrim in the same time.  The eyes are a little bit better.  She still has on and off fever of 101-102, and body aches.  She is stressed because her  had a stroke several weeks ago.  She comes today because this morning she noticed a rash all over her body, but not pruritus.  She also noticed hands pain and numbness for the last 3 days.  The fever, body aches, hands pain remind of the symptoms she had when she was first diagnosed with lupus.  She has been taking Advil which helps with the aches      SLE   -- Dx w SLE in 2010 and she decided to treat it holistically . She hasn't seen rheumato (Dr. Gonzalez) since then   -- Normal BMP, ESR Feb 22      ROS:                                                      ROS: negative for fever, chills, cough, wheezes, chest pain, shortness of breath, vomiting, abdominal pain, leg swelling, positive for rash, R eye lid inflammation     This document serves as a record of the services and decisions personally performed and made by Dr. Paolo MD. It was created on their behalf by Dorian Johnson, a trained medical scribe. The creation of this document is based on the provider's statements to the medical scribe.  Dorian Johnson February 28, 2020 12:09 PM      OBJECTIVE:                                                    Physical Exam :    Blood pressure 111/71, pulse 114, temperature 98.9  F (37.2  C), temperature source  "Oral, resp. rate 18, height 1.753 m (5' 9\"), weight 71.5 kg (157 lb 11.2 oz), last menstrual period 02/20/2020, SpO2 100 %, not currently breastfeeding.   NAD, appears comfortable  Skin: The rash on her face suggest classic butterfly rash of the lupus.  Above the nasolabial folds the skin is indurated, but not tender.  She states that it is chronic and sometimes when she squeezes the area as, smelly material comes out.  I wonder if she has some deep sebaceous cyst/acne that trigger the periorbital cellulitis  HEENT: PERRLA, EOMI, pink conjunctiva, anicteric sclerae, bilateral tympanic membranes are clinically normal, oropharynx is normal color  Neck: supple, no JVD, No thyroidmegaly. Lymph nodes nonpalpable cervical and supraclavicular.  Chest: clear to auscultation bilaterally, good respiratory effort  Heart: S1 S2, RRR, no mgr appreciated  Abdomen: soft, not tender, n  Extremities: no edema, hands with no joint inflammations  Neurologic: A, Ox3, no focal signs appreciated    PMHx: reviewed  Past Medical History:   Diagnosis Date     Arthritis      Lupus (H)      Lupus (H)       PSHx: reviewed  History reviewed. No pertinent surgical history.     Meds: reviewed  Current Outpatient Medications   Medication Sig Dispense Refill     cefdinir (OMNICEF) 300 MG capsule Take 300 mg by mouth       cholecalciferol (VITAMIN D3) 5000 units (125 mcg) capsule Take 16,000 Units by mouth daily       ferrous sulfate (FEROSUL) 325 (65 Fe) MG tablet Take 325 mg by mouth daily (with breakfast)       gabapentin (NEURONTIN) 100 MG capsule Take 100-200 mg by mouth 2 times daily as needed       oseltamivir (TAMIFLU) 75 MG capsule Take 1 capsule (75 mg) by mouth 2 times daily for 5 days 10 capsule 0     Probiotic Product (PROBIOTIC-10 PO) Take by mouth daily       sulfamethoxazole-trimethoprim (BACTRIM DS) 800-160 MG tablet Take 1 tablet by mouth       vitamin B-Complex Take 1 tablet by mouth daily         Soc Hx: reviewed  Fam Hx: " reviewed          Tomeka Boone MD  Internal Medicine

## 2020-02-29 LAB
CREAT UR-MCNC: 106 MG/DL
MICROALBUMIN UR-MCNC: 6 MG/L
MICROALBUMIN/CREAT UR: 5.97 MG/G CR (ref 0–25)

## 2020-03-02 LAB
ANA SER QL IF: NEGATIVE
DSDNA AB SER-ACNC: 2 IU/ML

## 2020-03-11 ENCOUNTER — OFFICE VISIT (OUTPATIENT)
Dept: INTERNAL MEDICINE | Facility: CLINIC | Age: 50
End: 2020-03-11
Payer: COMMERCIAL

## 2020-03-11 VITALS
DIASTOLIC BLOOD PRESSURE: 70 MMHG | BODY MASS INDEX: 23.25 KG/M2 | HEART RATE: 91 BPM | RESPIRATION RATE: 18 BRPM | TEMPERATURE: 98.2 F | HEIGHT: 69 IN | WEIGHT: 157 LBS | SYSTOLIC BLOOD PRESSURE: 110 MMHG | OXYGEN SATURATION: 99 %

## 2020-03-11 DIAGNOSIS — H57.11 PAIN OF RIGHT EYE: Primary | ICD-10-CM

## 2020-03-11 LAB
BASOPHILS # BLD AUTO: 0 10E9/L (ref 0–0.2)
BASOPHILS NFR BLD AUTO: 0.3 %
DIFFERENTIAL METHOD BLD: ABNORMAL
EOSINOPHIL # BLD AUTO: 0.1 10E9/L (ref 0–0.7)
EOSINOPHIL NFR BLD AUTO: 1.9 %
ERYTHROCYTE [DISTWIDTH] IN BLOOD BY AUTOMATED COUNT: 18.5 % (ref 10–15)
ERYTHROCYTE [SEDIMENTATION RATE] IN BLOOD BY WESTERGREN METHOD: 35 MM/H (ref 0–20)
HCT VFR BLD AUTO: 34.1 % (ref 35–47)
HGB BLD-MCNC: 10.5 G/DL (ref 11.7–15.7)
LYMPHOCYTES # BLD AUTO: 1.1 10E9/L (ref 0.8–5.3)
LYMPHOCYTES NFR BLD AUTO: 35.2 %
MCH RBC QN AUTO: 25.7 PG (ref 26.5–33)
MCHC RBC AUTO-ENTMCNC: 30.8 G/DL (ref 31.5–36.5)
MCV RBC AUTO: 84 FL (ref 78–100)
MONOCYTES # BLD AUTO: 0.4 10E9/L (ref 0–1.3)
MONOCYTES NFR BLD AUTO: 10.8 %
NEUTROPHILS # BLD AUTO: 1.7 10E9/L (ref 1.6–8.3)
NEUTROPHILS NFR BLD AUTO: 51.8 %
PLATELET # BLD AUTO: 450 10E9/L (ref 150–450)
RBC # BLD AUTO: 4.08 10E12/L (ref 3.8–5.2)
WBC # BLD AUTO: 3.2 10E9/L (ref 4–11)

## 2020-03-11 PROCEDURE — 36415 COLL VENOUS BLD VENIPUNCTURE: CPT | Performed by: FAMILY MEDICINE

## 2020-03-11 PROCEDURE — 85025 COMPLETE CBC W/AUTO DIFF WBC: CPT | Performed by: FAMILY MEDICINE

## 2020-03-11 PROCEDURE — 99214 OFFICE O/P EST MOD 30 MIN: CPT | Performed by: FAMILY MEDICINE

## 2020-03-11 PROCEDURE — 85652 RBC SED RATE AUTOMATED: CPT | Performed by: FAMILY MEDICINE

## 2020-03-11 ASSESSMENT — MIFFLIN-ST. JEOR: SCORE: 1406.53

## 2020-03-11 NOTE — PATIENT INSTRUCTIONS
We will notify you of your lab results, as discussed.    Use over-the-counter medications (e.g. Aleve, Tylenol), only as directed.    Follow-up with Ophthalmology and Rheumatology, as recommended.    Follow up if worsening symptoms, fever, redness/swelling, or not improving after 2-3 days.    Follow up in the ER immediately if:  severe/worsening headache, vision concerns, uncontrolled vomiting, weakness, stroke-like symptoms, or other concerning/emergent symptoms.

## 2020-03-11 NOTE — PROGRESS NOTES
SUBJECTIVE:   Mary Anne Angel is a 49 year old female presenting with a chief complaint of   Chief Complaint   Patient presents with     Eye Problem     Follow up preseptal cellulitis of right eye. She felt immense improvement starting about 24 hours after starting the prednisone, but now her symptoms have returned and are about the same as they were when she was here last time.        She is an established patient of [facility].    HPI:    The patient is a 49-year-old female, with a history of systemic lupus erythematosus (Dx-2010) and alcohol abuse.  Chart was reviewed.  Patient was seen in the ER for preseptal cellulitis of the right eye (confirmed on CT of the orbit 2/22/2020), acute sinusitis, and anemia 2/22/2020, treated with Bactrim DS and Omnicef 2/22/2020.  Patient followed up 2/25/2020, at which time she received Tamiflu for fever and myalgias, post negative influenza testing.  Eye pain was improving 2/25/2020, per chart review.  Patient was reevaluated 2/28/2020, at which time her Bactrim DS and Omnicef were stopped, due to a rash/possible allergic reaction.  Patient was given a short Prednisone taper 2/28/2020, advised to take Allegra versus Zyrtec.  Patient was advised to follow-up with Rheumatology regarding her systemic lupus erythematosus on 2/28/2020.  She was referred to OB/GYN 2/25/2020, based on her recent vaginal bleeding.    CBC 2/28/2020 showed: Hemoglobin 10.5, WBC count 2.7, and Platelets 237.  CMP 2/28/2028 was within normal limits, with the exception of the mildly elevated BUN (5), ALT (57), and nonfasting glucose (123).  DNA-ds, ELIZA, UMAR, and Urinalysis were within normal limits 2/28/2020.      Patient states that she had erythema and edema involving her right eyelid and periorbital region prior to starting the Bactrim DS and Omnicef.  Patient completed all but 1 day of the Bactrim DS and Omnicef prior to her allergic reaction, with complete resolution of the right eyelid and periorbital  "erythema and edema noted.  Patient states that she had a recurrence of her right eye pain and photophobia 2 days ago, but the eyelid and periorbital erythema and edema did not recur.  Symptoms resolved for 24 hours after taking Aleve 2 tablets x 1 dose 2 days ago, but the patient awakened with 5-6/10 right eye pain and photophobia again at 2 AM last evening.  Pain is not as severe as it was prior to her recent ER visit, but it does seem to be radiating from her right neck, \"like a pinched nerve\".  Periorbital movements aggravate her right eye pain.  Patient describes some mild, watery discharge from her right eye, but she denies significant nasal congestion.  Patient has had some nausea, but she denies fever, chills, vomiting, eye trauma, previous headache history, history of migraines, or having the worst headache of her life.  Vision is occasionally slightly blurry, but no current vision changes or acute neurologic deficits noted.      Patient incidentally describes chronic pain involving her right ear, which has been present more than a year.  Patient treats this with olive oil as needed, which she states improves her pain.    The patient's recent arthralgias resolved with Prednisone treatment.    Patient states that her right eye pain was almost resolved on antibiotic treatment prior to initiating the Prednisone taper on 2/28/2020.      Patient has had stress in the recent past, due to her  stroke.    Review of Systems  LMP 2/26/2020, denying risk for pregnancy or breast-feeding.  No chest pain or shortness of breath.  No vaginal bleeding or abdominal pain.    Patient Active Problem List   Diagnosis     Alcohol abuse       Past Medical History:   Diagnosis Date     Arthritis      Lupus (H)      Lupus (H)        Allergies   Allergen Reactions     Azithromycin Hives     Bactrim [Sulfamethoxazole W/Trimethoprim]      Skin rash Feb 2020. Patient was taking Omnicef + Bactrim     Omnicef [Cefdinir]      Skin " "rash Feb 2020. Patient was taking Omnicef + Bactrim       Family History   Problem Relation Age of Onset     Chronic Obstructive Pulmonary Disease Mother      Chronic Obstructive Pulmonary Disease Father      Stomach Cancer Paternal Grandmother        Current Outpatient Medications   Medication Sig Dispense Refill     cholecalciferol (VITAMIN D3) 5000 units (125 mcg) capsule Take 16,000 Units by mouth daily       ferrous sulfate (FEROSUL) 325 (65 Fe) MG tablet Take 325 mg by mouth daily (with breakfast)       gabapentin (NEURONTIN) 100 MG capsule Take 100-200 mg by mouth 2 times daily as needed       Probiotic Product (PROBIOTIC-10 PO) Take by mouth daily       vitamin B-Complex Take 1 tablet by mouth daily         Social History     Tobacco Use     Smoking status: Current Every Day Smoker     Smokeless tobacco: Never Used   Substance Use Topics     Alcohol use: Yes       OBJECTIVE  /70 (BP Location: Right arm, Patient Position: Sitting, Cuff Size: Adult Regular)   Pulse 91   Temp 98.2  F (36.8  C) (Oral)   Resp 18   Ht 1.753 m (5' 9\")   Wt 71.2 kg (157 lb)   LMP 02/20/2020 (Approximate)   SpO2 99%   Breastfeeding No   BMI 23.18 kg/m      Physical Exam    GENERAL APPEARANCE:  Awake and alert.  Oriented x3.  PSYCHIATRIC:  Pleasant, smiling affect.  HEENT: Wears glasses.  Sclera anicteric.  No conjunctivitis.  PERRLA.  Extraocular movements intact.  Mild to moderate photophobia noted on the right.  Right eye is mildly tender to palpation, which the patient states is a \"good pain\".  Bilateral TM's and canals within normal limits.  Not tender with palpation over the tragus and with retraction of the pinna bilaterally.  No obvious nasal congestion.  No sinus tenderness.  Tongue protrudes midline.  No erythema, edema, or exudates of the oral mucosa or posterior pharynx.  Mucous membranes moist.  NECK:  Spontaneous full range of motion.  No thyromegaly or mass.  No lymphadenopathy.  No nuchal rigidity.  No " midline tenderness, but mild paraspinal muscle tenderness noted on the right, without evidence of muscle spasm.    HEART:  Normal S1, S2.  Regular rate and rhythm.  No murmurs, rubs, or gallops.  LUNGS:  No respiratory distress.  No wheezes, rales, or rhonchi.  ABDOMEN:   Not distended.  EXTREMITIES:  Moves 4 extremities symmetrically.  NEUROLOGIC:  Cranial nerves II-XII grossly intact.  Gait within normal limits.    SKIN:  No rash.    Labs:  Results for orders placed or performed in visit on 03/11/20 (from the past 24 hour(s))   CBC with platelets and differential   Result Value Ref Range    WBC 3.2 (L) 4.0 - 11.0 10e9/L    RBC Count 4.08 3.8 - 5.2 10e12/L    Hemoglobin 10.5 (L) 11.7 - 15.7 g/dL    Hematocrit 34.1 (L) 35.0 - 47.0 %    MCV 84 78 - 100 fl    MCH 25.7 (L) 26.5 - 33.0 pg    MCHC 30.8 (L) 31.5 - 36.5 g/dL    RDW 18.5 (H) 10.0 - 15.0 %    Platelet Count 450 150 - 450 10e9/L    % Neutrophils 51.8 %    % Lymphocytes 35.2 %    % Monocytes 10.8 %    % Eosinophils 1.9 %    % Basophils 0.3 %    Absolute Neutrophil 1.7 1.6 - 8.3 10e9/L    Absolute Lymphocytes 1.1 0.8 - 5.3 10e9/L    Absolute Monocytes 0.4 0.0 - 1.3 10e9/L    Absolute Eosinophils 0.1 0.0 - 0.7 10e9/L    Absolute Basophils 0.0 0.0 - 0.2 10e9/L    Diff Method Automated Method    ESR: Erythrocyte sedimentation rate   Result Value Ref Range    Sed Rate 35 (H) 0 - 20 mm/h     Note:  CBC and Sed Rate were pending at the time of patient discharge today.      ASSESSMENT:      ICD-10-CM    1. Pain of right eye  H57.11 CBC with platelets and differential     ESR: Erythrocyte sedimentation rate     OPHTHALMOLOGY ADULT REFERRAL   2. History of lupus (H)  M32.9       Differential of eye pain was considered.  Patient had recent preseptal cellulitis, treated with Bactrim DS and Omnicef.  Current exam does not look consistent with preseptal cellulitis.  Differential includes cluster and migraine headaches.  Cannot rule out trigeminal neuralgia, given the  chronic pain involving the right ear.  Patient has not had previous imaging, outside of a CT of her orbits.  Doubt glaucoma, but differential was considered.    CBC is stable/unchanged, as compared with previous.    ESR has increased post recent preseptal cellulitis 2/22/2020 and likely allergic reaction 2/28/2020, currently 35.     PLAN:    Patient agrees with holding off on further antibiotics, further oral steroids, and MRI brain imaging post risk/benefits discussion today.    Patient is in agreement with the following plan:    Patient Instructions     We will notify you of your lab results, as discussed.    Use over-the-counter medications (e.g. Aleve, Tylenol), only as directed.    Follow-up with Ophthalmology and Rheumatology, as recommended.    Follow up if worsening symptoms, fever, redness/swelling, or not improving after 2-3 days.    Follow up in the ER immediately if:  severe/worsening headache, vision concerns, uncontrolled vomiting, weakness, stroke-like symptoms, or other concerning/emergent symptoms.    Discussed risks and benefits of treatment strategies, as noted in the Assessment and Plan sections.    The patient was discharged ambulatory and in stable condition post discussion of follow up.     Disclaimer: The above dictation was composed using a combination of keyboarding and voice recognition software.  As a result, there may be errors in the dictation that have gone undetected.  Please consider this when interpreting the information found in this chart.    Yissel Sutherland MD

## 2020-03-11 NOTE — NURSING NOTE
"/70 (BP Location: Right arm, Patient Position: Sitting, Cuff Size: Adult Regular)   Pulse 91   Temp 98.2  F (36.8  C) (Oral)   Resp 18   Ht 1.753 m (5' 9\")   Wt 71.2 kg (157 lb)   LMP 02/20/2020 (Approximate)   SpO2 99%   Breastfeeding No   BMI 23.18 kg/m    Shyla Abdul CMA    "

## 2020-03-30 ENCOUNTER — TELEPHONE (OUTPATIENT)
Dept: OPHTHALMOLOGY | Facility: CLINIC | Age: 50
End: 2020-03-30

## 2020-03-30 ENCOUNTER — TELEPHONE (OUTPATIENT)
Dept: OPHTHALMOLOGY | Facility: CLINIC | Age: 50
End: 2020-03-30
Payer: COMMERCIAL

## 2020-03-30 DIAGNOSIS — H00.11 CHALAZION OF RIGHT UPPER EYELID: Primary | ICD-10-CM

## 2020-03-30 PROCEDURE — 99212 OFFICE O/P EST SF 10 MIN: CPT | Mod: TEL | Performed by: OPHTHALMOLOGY

## 2020-03-30 RX ORDER — TOBRAMYCIN AND DEXAMETHASONE 3; 1 MG/ML; MG/ML
1 SUSPENSION/ DROPS OPHTHALMIC 4 TIMES DAILY
Qty: 1 BOTTLE | Refills: 1 | Status: SHIPPED | OUTPATIENT
Start: 2020-03-30 | End: 2020-11-24

## 2020-03-30 NOTE — TELEPHONE ENCOUNTER
Discussion with patient.  Hx as noted below.  Currently has mild swelling of right upper lid; mild redness and some light sensitivity.  No mattering or significant vision changes.  Recommend warm pack right eye for 10 minutes three times daily and Tobradex eye drop right eye four times daily.  I will call in a few days for an update. (Patient does state that systemic Prednisone significantly reduced her pain previously).  Kalyan Barnes M.D.  [phone]

## 2020-03-30 NOTE — TELEPHONE ENCOUNTER
Patient called- for the past 5 weeks, she has been experiencing increased pressure behind her right eye, along with redness, swelling, and itching of her right upper eyelid.  No specific bump within her eyelid and no tenderness to touch.  She was treated for preseptal cellulitis and given 2 oral antibiotics on 2-22-20.  She started one of the antibiotics, which improved her symptoms, but developed an adverse reaction to it.  Her symptoms began to return so she started the second antibiotic which again helped for a short time.  She also took Prednisone.  She currently has redness and photophobia of her right eye.  Vision is good except when her eye is watering.  No mattering.  Tylenol relieves most of the discomfort, but she is concerned about taking this chronically for the past 5 weeks.    Dr. Barnes will review this message, and advise.

## 2020-04-03 ENCOUNTER — TELEPHONE (OUTPATIENT)
Dept: OPHTHALMOLOGY | Facility: CLINIC | Age: 50
End: 2020-04-03

## 2020-04-03 NOTE — TELEPHONE ENCOUNTER
Discussion with patient.  Her symptoms are much improved with current regimen.  Recommend continuing warm packs and use Tobradex drop twice daily until gone.  Call if problems.  Kalyan Barnes M.D.

## 2020-11-24 DIAGNOSIS — H00.11 CHALAZION OF RIGHT UPPER EYELID: ICD-10-CM

## 2020-11-24 RX ORDER — TOBRAMYCIN AND DEXAMETHASONE 3; 1 MG/ML; MG/ML
1 SUSPENSION/ DROPS OPHTHALMIC 2 TIMES DAILY
Qty: 1 BOTTLE | Refills: 0 | Status: SHIPPED | OUTPATIENT
Start: 2020-11-24

## 2020-11-24 NOTE — TELEPHONE ENCOUNTER
Patient has some preseptal cellulitis in the past. Will send one bottle and no more refills until she calls for appointment

## 2021-01-15 ENCOUNTER — HEALTH MAINTENANCE LETTER (OUTPATIENT)
Age: 51
End: 2021-01-15

## 2021-02-09 ENCOUNTER — OFFICE VISIT (OUTPATIENT)
Dept: OPTOMETRY | Facility: CLINIC | Age: 51
End: 2021-02-09
Payer: COMMERCIAL

## 2021-02-09 DIAGNOSIS — H02.889 MEIBOMIAN GLAND DYSFUNCTION: Primary | ICD-10-CM

## 2021-02-09 DIAGNOSIS — H15.101 EPISCLERITIS OF RIGHT EYE: ICD-10-CM

## 2021-02-09 PROCEDURE — 99203 OFFICE O/P NEW LOW 30 MIN: CPT | Performed by: OPTOMETRIST

## 2021-02-09 ASSESSMENT — SLIT LAMP EXAM - LIDS: COMMENTS: MEIBOMIAN GLAND DYSFUNCTION

## 2021-02-09 ASSESSMENT — VISUAL ACUITY
OD_CC+: +2
OD_CC: 20/25
METHOD: SNELLEN - LINEAR
CORRECTION_TYPE: GLASSES

## 2021-02-09 ASSESSMENT — TONOMETRY
OD_IOP_MMHG: 15
IOP_METHOD: APPLANATION
OS_IOP_MMHG: 14

## 2021-02-09 ASSESSMENT — EXTERNAL EXAM - RIGHT EYE: OD_EXAM: NORMAL

## 2021-02-09 ASSESSMENT — EXTERNAL EXAM - LEFT EYE: OS_EXAM: NORMAL

## 2021-02-09 NOTE — LETTER
2/9/2021         RE: Mary Anne Angel  [address]  [place] [address]        Dear Colleague,    [facility] you [facility] Please see a copy of my visit note below.    Chief Complaint   Patient presents with     Eye Problem Right Eye       Do you wear contact lenses? Yes - she worse cls on Friday and had a very hard time removing them from her eye. She wears dailies for less than 8 hours when she does wear them.    Pt notes that when she was a child, she burned herself in the eye with a curling iron.  Pt has lupus.  Last used tobradex yesterday.      Pt typically goes to [facility]    HPI    Eye Problem Right Eye      In right eye. Onset was gradual. This started 1 year ago. Charactertized as blurred vision. Severity is moderate. Occurring constantly. It is worse throughout the day. Since onset it is stable. Associated symptoms include eye pain, redness, itching, tearing, discharge, double vision, photophobia, swelling, pain with eye movement, foreign body sensation, previous episodes, headache, and dryness. Treatments tried include eye drops and warm compresses. Response to treatment was mild improvement. Pain was noted as 6/10.   Comments      Feb 2020: urgent care, was diagnosed with oveitis  Dr Levi in March 2020: was given abx/steroid drop- was helpful, but came back. Got a couple refills but it keeps coming back.  It's come back again, tried drops again, and it's not getting better.  Noticing white bumps on the lash line. Sometimes experiencing FB sensation.    Has been having a lot of shoulder and neck issues; she has arthritis.   She had a cortizone injection Oct 2020 at [facility] in her shoulder and her eye completely cleared up at that time.     Doing warm compresses at least 3 times a day. She reports this is helpful, but does offer some relief. Noticing some swelling in the sinus area on the right side of her face.        Ellie Rowland CPO    Had difficulty w/ contact lens removal on Friday         See Review Of Systems       Medical, surgical and family histories reviewed and updated 2/9/2021.       Diagnosed w/ uveitis from [place]   Has been using Tobradex two times daily for 2 weeks  No artificial tears  , warm compresses with washcloth  Subjective improvement    History of episcleritis R history of foreign body in right  Contact lenses long term    OBJECTIVE: See Ophthalmology exam    ASSESSMENT:    ICD-10-CM    1. Meibomian gland dysfunction  H02.889    2. Episcleritis of right eye  H15.101     associated w/ SLE     Chemosis temporally could be due to contact lens adherence     PLAN:  Discontinue Tobradex  Discontinue contact lens wear until resolved and with flares  Proper warm compresses explained  Lid hygiene artificial tears ongoing      Nirali Shrestha OD       Again, thank you for allowing me to participate in the care of your patient.        Sincerely,        Nirali Shrestha, OD

## 2021-02-09 NOTE — PROGRESS NOTES
Chief Complaint   Patient presents with     Eye Problem Right Eye       Do you wear contact lenses? Yes - she worse cls on Friday and had a very hard time removing them from her eye. She wears dailies for less than 8 hours when she does wear them.    Pt notes that when she was a child, she burned herself in the eye with a curling iron.  Pt has lupus.  Last used tobradex yesterday.      Pt typically goes to [facility]    Eye Problem Right Eye      In right eye. Onset was gradual. This started 1 year ago. Charactertized as blurred vision. Severity is moderate. Occurring constantly. It is worse throughout the day. Since onset it is stable. Associated symptoms include eye pain, redness, itching, tearing, discharge, double vision, photophobia, swelling, pain with eye movement, foreign body sensation, previous episodes, headache, and dryness. Treatments tried include eye drops and warm compresses. Response to treatment was mild improvement. Pain was noted as 6/10.   Comments      Feb 2020: urgent care, was diagnosed with oveitis  Dr Levi in March 2020: was given abx/steroid drop- was helpful, but came back. Got a couple refills but it keeps coming back.  It's come back again, tried drops again, and it's not getting better.  Noticing white bumps on the lash line. Sometimes experiencing FB sensation.    Has been having a lot of shoulder and neck issues; she has arthritis.   She had a cortizone injection Oct 2020 at [facility] in her shoulder and her eye completely cleared up at that time.     Doing warm compresses at least 3 times a day. She reports this is helpful, but does offer some relief. Noticing some swelling in the sinus area on the right side of her face.       Ellie Rowland CPO    Had difficulty w/ contact lens removal on Friday         See Review Of Systems       Medical, surgical and family histories reviewed and updated 2/9/2021.       Diagnosed w/ uveitis from [place]   Has been using Tobradex two  times daily for 2 weeks  No artificial tears  , warm compresses with washcloth  Subjective improvement    History of episcleritis R history of foreign body in right  Contact lenses long term    OBJECTIVE: See Ophthalmology exam    ASSESSMENT:    ICD-10-CM    1. Meibomian gland dysfunction  H02.889    2. Episcleritis of right eye  H15.101     associated w/ SLE     Chemosis temporally could be due to contact lens adherence     PLAN:  Discontinue Tobradex  Discontinue contact lens wear until resolved and with flares  Proper warm compresses explained  Lid hygiene artificial tears ongoing      Nirali Shrestha OD

## 2021-02-09 NOTE — PATIENT INSTRUCTIONS
Discontinue Tobradex  Discontinue contact lens wear until resolved and with flares  Proper warm compresses explained  Lid hygiene artificial tears ongoing      Meibomian gland dysfunction or Posterior Blepharitis, is characterized by inflammation along both the uppper and lower eyelid margins. A single row of these glands is present in each lid with openings along the lid margins.  It is often found in association with skin conditions such as rosacea and seborrheic dermatitis.    Symptoms include:  ?Red eyes  ?Gritty or burning sensation  ?Excessive tearing  ?Itchy eyelids  ?Red, swollen eyelids  ?Crusting or matting of eyelashes in the morning  ?Light sensitivity  ?Blurred vision    It is important to keep cosmetics from blocking these oil glands. If blocked, they do not   excrete oil into the tear film, which causes the tears to evaporate quickly.   This may result in watery eyes.  There is also an increase of bacterial growth when the tear film is unstable, leading to further ocular surface inflammation.    Treatment:  1. Warm compresses for 5-10 minutes twice daily     2.  Keep the eyelid margins clean by using a commercial eye scrub or mild baby shampoo on a washcloth 1-2x daily    3. Use preservative free artificial tears 4-8x daily     For warm compresses    Moisten a washcloth with hot water, or microwave for 10 seconds, being careful to not get the cloth too hot.   Then put the washcloth onto your eyelids for 5 minutes. It will cool quickly so a rice pack or eyemask that can be heated and laid on top of the washcloth will help retain the heat.    Omega 3 fatty acid supplements taken once to twice daily and artificial tears such as Soothe xp, Refresh optive , Retaine and systane balance are also an additional treatment to control inflammation and help soothe your eyes.  Overabundance of bacterial microorganisms along the eyelashes and lid margins induce stress on the tear film and promote  inflammation.  Regular lid hygiene helps diminish the bacterial population to prevent inflammation and infection.  Use a warm compress to loosen crusts   Cleanse lids once daily with a lid cleansing product as directed such as Ocusoft or Sterilid which can be purchased at most pharmacies. Diluted baby shampoo will also work, but not as well as it dries the skin and can irritate eyelids.  Hypo chlor spray may also be used on closed lids.     no uveitis     Discontinue Tobradex   Glasses until resolved

## 2021-02-25 LAB — PAP SMEAR - HIM PATIENT REPORTED: NEGATIVE

## 2021-03-01 ENCOUNTER — TRANSFERRED RECORDS (OUTPATIENT)
Dept: HEALTH INFORMATION MANAGEMENT | Facility: CLINIC | Age: 51
End: 2021-03-01

## 2021-03-04 ENCOUNTER — TELEPHONE (OUTPATIENT)
Dept: OPHTHALMOLOGY | Facility: CLINIC | Age: 51
End: 2021-03-04

## 2021-03-04 ENCOUNTER — MYC MEDICAL ADVICE (OUTPATIENT)
Dept: OPTOMETRY | Facility: CLINIC | Age: 51
End: 2021-03-04

## 2021-03-04 NOTE — TELEPHONE ENCOUNTER
Patient called in today wanting to see Dr. Barnes. Was offered 240 pm today, cannot make this. Will offer 340 pm, cannot make this appt either. Offered 3-9-21 and she told Jacobo she can make it at 2 pm.

## 2021-03-09 ENCOUNTER — OFFICE VISIT (OUTPATIENT)
Dept: OPHTHALMOLOGY | Facility: CLINIC | Age: 51
End: 2021-03-09
Attending: FAMILY MEDICINE
Payer: COMMERCIAL

## 2021-03-09 DIAGNOSIS — L71.9 ROSACEA: ICD-10-CM

## 2021-03-09 DIAGNOSIS — H02.889 MEIBOMIAN GLAND DYSFUNCTION: Primary | ICD-10-CM

## 2021-03-09 DIAGNOSIS — H10.401 CHRONIC CONJUNCTIVITIS OF RIGHT EYE, UNSPECIFIED CHRONIC CONJUNCTIVITIS TYPE: ICD-10-CM

## 2021-03-09 PROCEDURE — 92012 INTRM OPH EXAM EST PATIENT: CPT | Performed by: OPHTHALMOLOGY

## 2021-03-09 RX ORDER — DOXYCYCLINE HYCLATE 50 MG/1
50 CAPSULE ORAL DAILY
Qty: 30 CAPSULE | Refills: 11 | Status: SHIPPED | OUTPATIENT
Start: 2021-03-09

## 2021-03-09 RX ORDER — SULFACETAMIDE SODIUM AND PREDNISOLONE SODIUM PHOSPHATE 100; 2.3 MG/ML; MG/ML
SOLUTION/ DROPS OPHTHALMIC
Qty: 5 ML | Refills: 4 | Status: SHIPPED | OUTPATIENT
Start: 2021-03-09

## 2021-03-09 ASSESSMENT — VISUAL ACUITY
METHOD: SNELLEN - LINEAR
OS_CC: 20/20
OD_CC: 20/25
CORRECTION_TYPE: GLASSES
OD_CC+: -1

## 2021-03-09 ASSESSMENT — TONOMETRY
OD_IOP_MMHG: 11
IOP_METHOD: ICARE
OS_IOP_MMHG: 13

## 2021-03-09 NOTE — PROGRESS NOTES
Current Eye Medications:  Lid hygeine.       Subjective:  For the past year she has been having problems with her right eye - symptoms of swelling of her eyelids, pain (causing her to not eat for three days), redness of conjunctiva, multiple pimple-like objects on her lash line (both upper and lower eyelids).  She frequently has mucous and mattering.  She has tried artificial tears, Tobradex drops, and lid hygiene.  In the past Tobradex has helped while she is using it, then it returns after a short time when it was discontinued.  She has had intermittent episodes of diplopia, and photophobia.    Today, her symptoms are improved (conjunctiva is slightly red, and she only has discomfort with eye movement).  Vision is slightly blurry, right eye.     No contact lenses recently, but she has worn them in between inflammation episodes.       Objective:  See Ophthalmology Exam.       Assessment:  Intermittent episodes of pain, mattering, and swelling of right eye in patient with lupus.  Rosacea and MGD.  Possible orbital inflammatory syndrome?      Plan:  Doxycycline 50 mg daily ongoing.  Warm pack to eyes for 10 minutes twice daily.  Sulfa/Pred drops right eye three times daily if bad flare up.  Return visit 4 months for an MD check.  Kalyan Barnes M.D.  [phone]

## 2021-03-09 NOTE — LETTER
3/9/2021         RE: Mary Anne Angel  [address]  [place] [address]        Dear Colleague,    Thank you for referring your patient, Mary Anne Angel, to the [facility]. Please see a copy of my visit note below.     Current Eye Medications:  Lid hygeine.       Subjective:  For the past year she has been having problems with her right eye - symptoms of swelling of her eyelids, pain (causing her to not eat for three days), redness of conjunctiva, multiple pimple-like objects on her lash line (both upper and lower eyelids).  She frequently has mucous and mattering.  She has tried artificial tears, Tobradex drops, and lid hygiene.  In the past Tobradex has helped while she is using it, then it returns after a short time when it was discontinued.  She has had intermittent episodes of diplopia, and photophobia.    Today, her symptoms are improved (conjunctiva is slightly red, and she only has discomfort with eye movement).  Vision is slightly blurry, right eye.     No contact lenses recently, but she has worn them in between inflammation episodes.       Objective:  See Ophthalmology Exam.       Assessment:  Intermittent episodes of pain, mattering, and swelling of right eye in patient with lupus.  Rosacea and MGD.  Possible orbital inflammatory syndrome?      Plan:  Doxycycline 50 mg daily ongoing.  Warm pack to eyes for 10 minutes twice daily.  Sulfa/Pred drops right eye three times daily if bad flare up.  Return visit 4 months for an MD check.  Kalyan Barnes M.D.  [phone]           Again, thank you for allowing me to participate in the care of your patient.        Sincerely,        Kalyan Barnes MD

## 2021-03-09 NOTE — LETTER
[facility]  [address]  [place] [address]  Phone: [phone]    03/09/21    Mary Anne Angel  [address] [address]      To whom it may concern:     Mary Anne Angel was seen in our office today, 3-9-21.  She has an inflamed eye requiring treatment.  She should be off work until Monday 3/15/2021.      Sincerely,      Kalyan Barnes MD

## 2021-03-09 NOTE — PATIENT INSTRUCTIONS
Doxycycline 50 mg daily ongoing.  Warm pack to eyes for 10 minutes twice daily.  Sulfa/Pred drops right eye three times daily if bad flare up.  Return visit 4 months for an MD check.  Kalyan Barnes M.D.  [phone]

## 2021-03-11 PROBLEM — H10.401: Status: ACTIVE | Noted: 2021-03-11

## 2021-03-11 PROBLEM — L71.9 ROSACEA: Status: ACTIVE | Noted: 2021-03-11

## 2021-03-11 ASSESSMENT — EXTERNAL EXAM - LEFT EYE: OS_EXAM: ROSACEA

## 2021-03-11 ASSESSMENT — EXTERNAL EXAM - RIGHT EYE: OD_EXAM: ROSACEA

## 2021-03-11 ASSESSMENT — SLIT LAMP EXAM - LIDS
COMMENTS: 2+ MEIBOMIAN GLAND DYSFUNCTION
COMMENTS: 2+ MEIBOMIAN GLAND DYSFUNCTION

## 2021-03-12 ENCOUNTER — MYC MEDICAL ADVICE (OUTPATIENT)
Dept: OPHTHALMOLOGY | Facility: CLINIC | Age: 51
End: 2021-03-12

## 2021-03-12 NOTE — TELEPHONE ENCOUNTER
Dr. Barnes was able to complete her form today.   I called the patient to let her know.  She requests the form be emailed to her.  Form emailed to patient.  Original will be abstracted into her chart.  
Message printed and given to Dr. Barnes   
f/u Pathology  pain control  -Keep drain site bandage clean and dry. may remove in 3/4 days

## 2021-03-14 ENCOUNTER — HEALTH MAINTENANCE LETTER (OUTPATIENT)
Age: 51
End: 2021-03-14

## 2021-03-15 ENCOUNTER — DOCUMENTATION ONLY (OUTPATIENT)
Dept: OPHTHALMOLOGY | Facility: CLINIC | Age: 51
End: 2021-03-15

## 2021-03-15 NOTE — PROGRESS NOTES
Received FMLA forms and scanned it into chart and then placed into the HIM bin.    Jacobo SIMON  Patient Rep

## 2021-03-22 ASSESSMENT — ENCOUNTER SYMPTOMS
FREQUENCY: 0
HEMATOCHEZIA: 0
DIARRHEA: 0
HEMATURIA: 0
HEADACHES: 1
FEVER: 0
NERVOUS/ANXIOUS: 1
EYE PAIN: 1
ABDOMINAL PAIN: 0
BREAST MASS: 0
CHILLS: 0
CONSTIPATION: 0
DIZZINESS: 0
COUGH: 0

## 2021-03-22 ASSESSMENT — PATIENT HEALTH QUESTIONNAIRE - PHQ9
SUM OF ALL RESPONSES TO PHQ QUESTIONS 1-9: 8
10. IF YOU CHECKED OFF ANY PROBLEMS, HOW DIFFICULT HAVE THESE PROBLEMS MADE IT FOR YOU TO DO YOUR WORK, TAKE CARE OF THINGS AT HOME, OR GET ALONG WITH OTHER PEOPLE: SOMEWHAT DIFFICULT
SUM OF ALL RESPONSES TO PHQ QUESTIONS 1-9: 8

## 2021-03-23 ENCOUNTER — OFFICE VISIT (OUTPATIENT)
Dept: INTERNAL MEDICINE | Facility: CLINIC | Age: 51
End: 2021-03-23
Payer: COMMERCIAL

## 2021-03-23 VITALS
BODY MASS INDEX: 23.25 KG/M2 | DIASTOLIC BLOOD PRESSURE: 89 MMHG | HEIGHT: 69 IN | WEIGHT: 157 LBS | RESPIRATION RATE: 16 BRPM | HEART RATE: 76 BPM | OXYGEN SATURATION: 99 % | SYSTOLIC BLOOD PRESSURE: 127 MMHG

## 2021-03-23 DIAGNOSIS — L71.9 BLEPHARITIS OF BOTH EYES WITH ROSACEA: ICD-10-CM

## 2021-03-23 DIAGNOSIS — H01.006 BLEPHARITIS OF BOTH EYES WITH ROSACEA: ICD-10-CM

## 2021-03-23 DIAGNOSIS — H01.003 BLEPHARITIS OF BOTH EYES WITH ROSACEA: ICD-10-CM

## 2021-03-23 DIAGNOSIS — L71.9 ROSACEA: Primary | ICD-10-CM

## 2021-03-23 PROCEDURE — 99212 OFFICE O/P EST SF 10 MIN: CPT | Performed by: INTERNAL MEDICINE

## 2021-03-23 ASSESSMENT — ENCOUNTER SYMPTOMS
HEADACHES: 1
EYE PAIN: 1
HEMATOCHEZIA: 0
HEMATURIA: 0
FREQUENCY: 0
FEVER: 0
DIZZINESS: 0
COUGH: 0
DIARRHEA: 0
NERVOUS/ANXIOUS: 1
CONSTIPATION: 0
ABDOMINAL PAIN: 0
CHILLS: 0
BREAST MASS: 0

## 2021-03-23 ASSESSMENT — PATIENT HEALTH QUESTIONNAIRE - PHQ9: SUM OF ALL RESPONSES TO PHQ QUESTIONS 1-9: 8

## 2021-03-23 ASSESSMENT — MIFFLIN-ST. JEOR: SCORE: 1401.53

## 2021-03-23 NOTE — PROGRESS NOTES
Dr Boone's note      Patient's instructions / PLAN:                                                        Plan:  1. Your provider has referred you to:     FMG:Dermatology [facility] [phone]   https://www.[place].[facility] [phone]   http://www.[place].[facility]ermatologySo[facility]/    [facility] [place] [phone]  Http://www.skincareAlta Vista Regional Hospital.com/locations/[place].html  [facility] Dermatology [facility] [phone]   http://www.[facility]Healthat[facility]/  [place] [phone]   http://[email or web address].[email or web address]/  FHN: [facility] Dermatology [facility] [phone]   http://www.[facility].[employer]/  FHN: [place] [facility] [phone]  http://www."[employer]Nuenz.com  FHN: [facility] [place] [phone]   http://[email or web address]  FHN: [facility] - [place] [phone]   http://[email or web address]  [place] [phone]   http://[email or web address]  [facility] [place] [facility] [place] [phone]   http://[email or web address]  [facility] [place] [phone]   http://www.associatedskSt. Mary's Regional Medical Centerare.com/  [facility] Dermatology [facility] [phone]   http://www.Free-lance.ru.com/services/dermatology/  [facility] [place] [phone]   http://[email or web address]  [facility] [place] [phone]   http://www.[facility]nd[facility] Memorial Hospitalatology.[employer]/      ASSESSMENT & PLAN:                                                      (L71.9) Rosacea  (primary encounter diagnosis)  Comment:   Plan: DERMATOLOGY ADULT REFERRAL            (H01.003,  H01.006,  L71.9) Blepharitis of both eyes with rosacea  Comment:   Plan: f/u w eye doctor        Chief complaint:                                                      eyes    SUBJECTIVE:                                                     History of present illness:    Patient had annual physical exam Feb 25, 2021 at [facility]     Rosacea  -- she was dx w rosacea of the eyelids. She f/u with the eye doctor. She was prescribed Doxy and eye drops    Skin rash   -- she has cheeks skin rash   --     If she stays home and take care of the face the skin and the eyelids are getting better  As soon as she puts the mask on she has a flare up       Eyes are a constant problem When the mask is on the air blows up into the eyes. She gets a flare up and the eyelids are very painful.     She has tried different masks     The eye doctor gave her a temporary leave. She would like it to extend it. I advised her to talk to the eye doctor     She f/u w [facility] for the R shoulder     CareEverywhere/Patient reports:  -- pap smear done on Feb 25,, 2021 with OBGYN. It was normal . Info sent to abstraction   -- mammogram done on March 2021 with OBGYN. It was normal . Info sent to abstraction       Healthy Habits:     Getting at least 3 servings of Calcium per day:  Yes    Bi-annual eye exam:  Yes    Dental care twice a year:  Yes    Sleep apnea or symptoms of sleep apnea:  None    Diet:  Regular (no restrictions)    Frequency of exercise:  2-3 days/week    Duration of exercise:  15-30 minutes    Taking medications regularly:  Yes    Medication side effects:  None    PHQ-2 Total Score: 1    Additional concerns today:  Yes             Review of Systems   Constitutional: Negative for chills and fever.   HENT: Positive for ear pain. Negative for congestion.    Eyes: Positive for pain.   Respiratory: Negative for cough.    Cardiovascular: Negative for chest pain.   Gastrointestinal: Negative for abdominal pain, constipation, diarrhea and hematochezia.   Breasts:  Negative for breast mass.   Genitourinary: Negative for frequency, genital sores, hematuria, pelvic pain and vaginal discharge.   Neurological: Positive for headaches. Negative for dizziness.  "  Psychiatric/Behavioral: The patient is nervous/anxious.    Answers for HPI/ROS submitted by the patient on 3/22/2021   Annual Exam:  If you checked off any problems, how difficult have these problems made it for you to do your work, take care of things at home, or get along with other people?: Somewhat difficult  PHQ9 TOTAL SCORE: 8             Review of Systems:                                                      ROS: negative for fever, chills, cough, wheezes, chest pain, shortness of breath, vomiting, abdominal pain, leg swelling    OBJECTIVE:             Physical exam:  Blood pressure 127/89, pulse 76, resp. rate 16, height 1.753 m (5' 9\"), weight 71.2 kg (157 lb), SpO2 99 %, not currently breastfeeding.     NAD, appears comfortable  Skin mild rash on the face.     Neurologic: A, Ox3, no focal signs appreciated    PMHx: reviewed  Past Medical History:   Diagnosis Date     Arthritis      Lupus (H)      Lupus (H)       PSHx: reviewed  No past surgical history on file.     Meds: reviewed  Current Outpatient Medications   Medication Sig Dispense Refill     cholecalciferol (VITAMIN D3) 5000 units (125 mcg) capsule Take 16,000 Units by mouth daily       doxycycline hyclate (VIBRAMYCIN) 50 MG capsule Take 1 capsule (50 mg) by mouth daily 30 capsule 11     ferrous sulfate (FEROSUL) 325 (65 Fe) MG tablet Take 325 mg by mouth daily (with breakfast)       Probiotic Product (PROBIOTIC-10 PO) Take by mouth daily       sulfacetamide-prednisoLONE (VASOCIDIN) 10-0.23 % ophthalmic solution Take one drop right eye three times daily as needed redness and drainage 5 mL 4     tobramycin-dexamethasone (TOBRADEX) 0.3-0.1 % ophthalmic suspension Place 1 drop into the right eye 2 times daily Please call for appointment for anymore refills. 1 Bottle 0     vitamin B-Complex Take 1 tablet by mouth daily         Soc Hx: reviewed  Fam Hx: reviewed          Tomeka Boone MD  Internal Medicine     "

## 2021-03-23 NOTE — Clinical Note
CareEverywhere/Patient reports:  -- pap smear done on Feb 25,, 2021 with OBGYN. It was normal . Info sent to abstraction   -- mammogram done on March 2021 with OBGYN. It was normal . Info sent to abstraction

## 2021-03-23 NOTE — PATIENT INSTRUCTIONS
Plan:  1. Your provider has referred you to:     FMG:Dermatology [facility] [phone]   https://www.[email or web address].org/locations/[facility] [phone]   http://www.[place].org/[facility]ermatology[facility]/    [facility] [place] [phone]  Http://www.skincareCarlsbad Medical Center.com/locations/[place].html  [facility] Dermatology [facility] [phone]   http://www.[facility]at[facility].net/  [place] [phone]   http://www.[facility] Hospitalatology.net/  FHN: [facility] Dermatology [facility] [phone]   http://www.[facility].[employer]/  FHN: [place] [facility] [place] [phone]  http://www.Global Research Innovation & Technologyatology.com  FHN: [facility] [place] [phone]   http://[email or web address]  FHN: [facility] [place] [phone]   http://[email or web address]  [place] [phone]   http://[email or web address]  [facility] [place] - [place] [phone]   http://[email or web address]/  [facility] [place] [phone]   http://www.associatedTidalHealth Nanticoke.com/  [facility] Dermatology [facility] [phone]   http://www.Fiber Optionsre.com/services/dermatology/  [facility]atology P[facility] - [place] [phone]   [email or web address]  [place] [facility] - [place] [phone]   http://[email or web address]

## 2021-03-26 ENCOUNTER — MYC MEDICAL ADVICE (OUTPATIENT)
Dept: OPHTHALMOLOGY | Facility: CLINIC | Age: 51
End: 2021-03-26

## 2021-03-31 ENCOUNTER — OFFICE VISIT (OUTPATIENT)
Dept: OPHTHALMOLOGY | Facility: CLINIC | Age: 51
End: 2021-03-31
Payer: COMMERCIAL

## 2021-03-31 DIAGNOSIS — H15.101 EPISCLERITIS OF RIGHT EYE: ICD-10-CM

## 2021-03-31 DIAGNOSIS — H02.889 MEIBOMIAN GLAND DYSFUNCTION: ICD-10-CM

## 2021-03-31 DIAGNOSIS — H10.401 CHRONIC CONJUNCTIVITIS OF RIGHT EYE, UNSPECIFIED CHRONIC CONJUNCTIVITIS TYPE: Primary | ICD-10-CM

## 2021-03-31 DIAGNOSIS — L71.9 ROSACEA: ICD-10-CM

## 2021-03-31 PROCEDURE — 92012 INTRM OPH EXAM EST PATIENT: CPT | Performed by: OPHTHALMOLOGY

## 2021-03-31 RX ORDER — FLUOROMETHOLONE 0.1 %
1 SUSPENSION, DROPS(FINAL DOSAGE FORM)(ML) OPHTHALMIC (EYE) 3 TIMES DAILY PRN
Qty: 5 ML | Refills: 4 | Status: SHIPPED | OUTPATIENT
Start: 2021-03-31

## 2021-03-31 ASSESSMENT — VISUAL ACUITY
OS_CC+: -1
METHOD: SNELLEN - LINEAR
OS_CC: 20/30
CORRECTION_TYPE: GLASSES
OD_CC: 20/25
OD_CC+: -1

## 2021-03-31 ASSESSMENT — TONOMETRY
IOP_METHOD: ICARE
OS_IOP_MMHG: 12
OD_IOP_MMHG: 10

## 2021-03-31 ASSESSMENT — SLIT LAMP EXAM - LIDS: COMMENTS: 2+ MEIBOMIAN GLAND DYSFUNCTION

## 2021-03-31 ASSESSMENT — EXTERNAL EXAM - RIGHT EYE: OD_EXAM: ROSACEA

## 2021-03-31 ASSESSMENT — EXTERNAL EXAM - LEFT EYE: OS_EXAM: ROSACEA

## 2021-03-31 NOTE — PROGRESS NOTES
Current Eye Medications:  Doxycycline 50 mg daily. Warm pack to eyes 4 times daily. Sulfa/Pred drops right eye 2-3 times daily.     Subjective:  3 week re-check intermittent flares of blurry ness and lid swelling and pain that goes to back of head when it flares up. Patient takes Aleve for pain. Feels like right eye has pressure and it is being pushed out. 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 Patient would like to focus on eyes.     Patient seeing Derm 4/20.     Objective:  See Ophthalmology Exam.       Assessment:  Intermittent episodes of pain, mattering, and swelling of right eye in patient with lupus.  Rosacea and MGD.  Possible orbital inflammatory syndrome?      Plan:  Doxycycline 50 mg daily.   Stop the Sulfa pred and start FML: One drop both eyes three times daily as needed   Lid hygiene twice daily   Warm pack to eyes 2-3 times daily.   Return visit 2 months for an MD check.  I agree with plan for FMLA through end of school year.    Kalyan Barnes M.D.  [phone]

## 2021-03-31 NOTE — LETTER
3/31/2021         RE: Mary Anne Angel  [address]  [place] [address]        Dear Colleague,    Thank you for referring your patient, Mary Anne Angel, to the [facility]. Please see a copy of my visit note below.     Current Eye Medications:  Doxycycline 50 mg daily. Warm pack to eyes 4 times daily. Sulfa/Pred drops right eye 2-3 times daily.     Subjective:  3 week re-check intermittent flares of blurry ness and lid swelling and pain that goes to back of head when it flares up. Patient takes Aleve for pain. Feels like right eye has pressure and it is being pushed out. 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 Patient would like to focus on eyes.     Patient seeing Derm 4/20.     Objective:  See Ophthalmology Exam.       Assessment:  Intermittent episodes of pain, mattering, and swelling of right eye in patient with lupus.  Rosacea and MGD.  Possible orbital inflammatory syndrome?      Plan:  Doxycycline 50 mg daily.   Stop the Sulfa pred and start FML: One drop both eyes three times daily as needed   Lid hygiene twice daily   Warm pack to eyes 2-3 times daily.   Return visit 2 months for an MD check.  I agree with plan for FMLA through end of school year.    Kalyan Barnes M.D.  [phone]               Again, thank you for allowing me to participate in the care of your patient.        Sincerely,        Kalyan Barnes MD

## 2021-03-31 NOTE — PATIENT INSTRUCTIONS
Doxycycline 50 mg daily.   Stop the Sulfa pred and start FML: One drop both eyes three times daily as needed   Lid hygiene twice daily   Warm pack to eyes 2-3 times daily.   Return visit 2 months for an MD check.    Kalyan Barnes M.D.  [phone]

## 2021-04-01 ENCOUNTER — MYC MEDICAL ADVICE (OUTPATIENT)
Dept: OPHTHALMOLOGY | Facility: CLINIC | Age: 51
End: 2021-04-01

## 2021-04-06 ENCOUNTER — DOCUMENTATION ONLY (OUTPATIENT)
Dept: OPHTHALMOLOGY | Facility: CLINIC | Age: 51
End: 2021-04-06

## 2021-04-20 ENCOUNTER — OFFICE VISIT (OUTPATIENT)
Dept: DERMATOLOGY | Facility: CLINIC | Age: 51
End: 2021-04-20
Attending: INTERNAL MEDICINE
Payer: COMMERCIAL

## 2021-04-20 VITALS — HEART RATE: 78 BPM | OXYGEN SATURATION: 99 % | DIASTOLIC BLOOD PRESSURE: 84 MMHG | SYSTOLIC BLOOD PRESSURE: 117 MMHG

## 2021-04-20 DIAGNOSIS — L81.4 LENTIGO: ICD-10-CM

## 2021-04-20 DIAGNOSIS — D18.01 ANGIOMA OF SKIN: ICD-10-CM

## 2021-04-20 DIAGNOSIS — L71.9 ROSACEA: ICD-10-CM

## 2021-04-20 DIAGNOSIS — D48.5 NEOPLASM OF UNCERTAIN BEHAVIOR OF SKIN: Primary | ICD-10-CM

## 2021-04-20 DIAGNOSIS — L82.1 SEBORRHEIC KERATOSIS: ICD-10-CM

## 2021-04-20 DIAGNOSIS — D22.9 NEVUS: ICD-10-CM

## 2021-04-20 DIAGNOSIS — M32.9 HISTORY OF SYSTEMIC LUPUS ERYTHEMATOSUS (SLE) (H): ICD-10-CM

## 2021-04-20 PROCEDURE — 88312 SPECIAL STAINS GROUP 1: CPT | Performed by: DERMATOLOGY

## 2021-04-20 PROCEDURE — 88346 IMFLUOR 1ST 1ANTB STAIN PX: CPT | Performed by: DERMATOLOGY

## 2021-04-20 PROCEDURE — 11104 PUNCH BX SKIN SINGLE LESION: CPT | Performed by: PHYSICIAN ASSISTANT

## 2021-04-20 PROCEDURE — 88305 TISSUE EXAM BY PATHOLOGIST: CPT | Performed by: DERMATOLOGY

## 2021-04-20 PROCEDURE — 99214 OFFICE O/P EST MOD 30 MIN: CPT | Mod: 25 | Performed by: PHYSICIAN ASSISTANT

## 2021-04-20 PROCEDURE — 11105 PUNCH BX SKIN EA SEP/ADDL: CPT | Performed by: PHYSICIAN ASSISTANT

## 2021-04-20 PROCEDURE — 88350 IMFLUOR EA ADDL 1ANTB STN PX: CPT | Performed by: DERMATOLOGY

## 2021-04-20 RX ORDER — METRONIDAZOLE 7.5 MG/G
GEL TOPICAL
Qty: 45 G | Refills: 11 | Status: SHIPPED | OUTPATIENT
Start: 2021-04-20

## 2021-04-20 NOTE — PROGRESS NOTES
HPI:   Chief complaints: Mary Anne Angel is a pleasant 50 year old female who presents for Full skin cancer screening to rule out skin cancer   Last Skin Exam: n/a      1st Baseline: yes  Personal HX of Skin Cancer: no   Personal HX of Malignant Melanoma: no   Family HX of Skin Cancer / Malignant Melanoma: no  Personal HX of Atypical Moles:   no  Risk factors: history of sun exposure and burns  New / Changing lesions:yes possible rosacea or lupus on the face. She has a h/o SLE years ago. Her lab work was initially abnormal but then went back to normal. She has not had any systemic sx in years.   Social History:   On review of systems, there are no further skin complaints, patient is feeling otherwise well.  See patient intake sheet.  ROS of the following were done and are negative: Constitutional, Eyes, Ears, Nose,   Mouth, Throat, Cardiovascular, Respiratory, GI, Genitourinary, Musculoskeletal,   Psychiatric, Endocrine, Allergic/Immunologic.    PHYSICAL EXAM:   /84   Pulse 78   SpO2 99%   Skin exam performed as follows: Type 2 skin. Mood appropriate  Alert and Oriented X 3. Well developed, well nourished in no distress.  General appearance: Normal  Head including face: Normal  Eyes: conjunctiva and lids: Normal  Mouth: Lips, teeth, gums: Normal  Neck: Normal  Chest-breast/axillae: Normal  Back: Normal  Spleen and liver: Normal  Cardiovascular: Exam of peripheral vascular system by observation for swelling, varicosities, edema: Normal  Genitalia: groin, buttocks: Normal  Extremities: digits/nails (clubbing): Normal  Eccrine and Apocrine glands: Normal  Right upper extremity: Normal  Left upper extremity: Normal  Right lower extremity: Normal  Left lower extremity: Normal  Skin: Scalp and body hair: See below    Pt deferred exam of breasts, groin, buttocks: No, Not applicable    Other physical findings:  1. Multiple pigmented macules on extremities and trunk  2. Multiple pigmented macules on face, trunk and  extremities  3. Multiple vascular papules on trunk, arms and legs  4. Multiple scattered keratotic plaques  5. Pustules on the neck and chest  6. Thickened pink plaques on bilateral upper cheeks  7. Dilated pore on the right cheek     Except as noted above, no other signs of skin cancer or melanoma.     ASSESSMENT/PLAN:   Benign Full skin cancer screening today. . Patient with history of none  Advised on monthly self exams and 1 year  Patient Education: Appropriate brochures given.    1. Multiple benign appearing nevi on arms, legs and trunk. Discussed ABCDEs of melanoma and sunscreen.   2. Multiple lentigos on arms, legs and trunk. Advised benign, no treatment needed.  3. Multiple scattered angiomas. Advised benign, no treatment needed.   4. Seborrheic keratosis on arms, legs and trunk. Advised benign, no treatment needed.  5. Rosacea vs folliculitis on the neck - start metrogel BID  6. Discoid lupus vs rosacea vs other on the right upper cheek.  1% Lidocaine with epinephrine for anesthetic, with sterile technique a 3 mm punch biopsy was used to obtain a biopsy specimen of the lesion. Hemostasis was obtained by pressure and wound was sutured with one 6-0 sutures. Antibiotic dressing was applied, and wound care instructions provided. The specimen is labeled and sent to pathology for evaluation. The procedure was well tolerated without complications  7. Pore of wiener vs other on the right cheek.  1% Lidocaine with epinephrine for anesthetic, with sterile technique a 3 mm punch biopsy was used to obtain a biopsy specimen of the lesion. Hemostasis was obtained by pressure and wound was sutured with one 6-0 sutures. Antibiotic dressing was applied, and wound care instructions provided. The specimen is labeled and sent to pathology for evaluation. The procedure was well tolerated without complications              Follow-up: pending path    1.) Patient was asked about new and changing moles. YES  2.) Patient received a  complete physical skin examination: YES  3.) Patient was counseled to perform a monthly self skin examination: YES  Scribed By: Fern Cortez MS, PA-C

## 2021-04-20 NOTE — LETTER
4/20/2021         RE: Mary Anne Angel  [address]  [place] [address]        Dear Colleague,    Thank you for referring your patient, Mary Anne Angel, to the [facility]. Please see a copy of my visit note below.    HPI:   Chief complaints: Mary Anne Angel is a pleasant 50 year old female who presents for Full skin cancer screening to rule out skin cancer   Last Skin Exam: n/a      1st Baseline: yes  Personal HX of Skin Cancer: no   Personal HX of Malignant Melanoma: no   Family HX of Skin Cancer / Malignant Melanoma: no  Personal HX of Atypical Moles:   no  Risk factors: history of sun exposure and burns  New / Changing lesions:yes possible rosacea or lupus on the face. She has a h/o SLE years ago. Her lab work was initially abnormal but then went back to normal. She has not had any systemic sx in years.   Social History:   On review of systems, there are no further skin complaints, patient is feeling otherwise well.  See patient intake sheet.  ROS of the following were done and are negative: Constitutional, Eyes, Ears, Nose,   Mouth, Throat, Cardiovascular, Respiratory, GI, Genitourinary, Musculoskeletal,   Psychiatric, Endocrine, Allergic/Immunologic.    PHYSICAL EXAM:   /84   Pulse 78   SpO2 99%   Skin exam performed as follows: Type 2 skin. Mood appropriate  Alert and Oriented X 3. Well developed, well nourished in no distress.  General appearance: Normal  Head including face: Normal  Eyes: conjunctiva and lids: Normal  Mouth: Lips, teeth, gums: Normal  Neck: Normal  Chest-breast/axillae: Normal  Back: Normal  Spleen and liver: Normal  Cardiovascular: Exam of peripheral vascular system by observation for swelling, varicosities, edema: Normal  Genitalia: groin, buttocks: Normal  Extremities: digits/nails (clubbing): Normal  Eccrine and Apocrine glands: Normal  Right upper extremity: Normal  Left upper extremity: Normal  Right lower extremity: Normal  Left  lower extremity: Normal  Skin: Scalp and body hair: See below    Pt deferred exam of breasts, groin, buttocks: No, Not applicable    Other physical findings:  1. Multiple pigmented macules on extremities and trunk  2. Multiple pigmented macules on face, trunk and extremities  3. Multiple vascular papules on trunk, arms and legs  4. Multiple scattered keratotic plaques  5. Pustules on the neck and chest  6. Thickened pink plaques on bilateral upper cheeks  7. Dilated pore on the right cheek     Except as noted above, no other signs of skin cancer or melanoma.     ASSESSMENT/PLAN:   Benign Full skin cancer screening today. . Patient with history of none  Advised on monthly self exams and 1 year  Patient Education: Appropriate brochures given.    1. Multiple benign appearing nevi on arms, legs and trunk. Discussed ABCDEs of melanoma and sunscreen.   2. Multiple lentigos on arms, legs and trunk. Advised benign, no treatment needed.  3. Multiple scattered angiomas. Advised benign, no treatment needed.   4. Seborrheic keratosis on arms, legs and trunk. Advised benign, no treatment needed.  5. Rosacea vs folliculitis on the neck - start metrogel BID  6. Discoid lupus vs rosacea vs other on the right upper cheek.  1% Lidocaine with epinephrine for anesthetic, with sterile technique a 3 mm punch biopsy was used to obtain a biopsy specimen of the lesion. Hemostasis was obtained by pressure and wound was sutured with one 6-0 sutures. Antibiotic dressing was applied, and wound care instructions provided. The specimen is labeled and sent to pathology for evaluation. The procedure was well tolerated without complications  7. Pore of wiener vs other on the right cheek.  1% Lidocaine with epinephrine for anesthetic, with sterile technique a 3 mm punch biopsy was used to obtain a biopsy specimen of the lesion. Hemostasis was obtained by pressure and wound was sutured with one 6-0 sutures. Antibiotic dressing was applied, and wound  care instructions provided. The specimen is labeled and sent to pathology for evaluation. The procedure was well tolerated without complications              Follow-up: pending path    1.) Patient was asked about new and changing moles. YES  2.) Patient received a complete physical skin examination: YES  3.) Patient was counseled to perform a monthly self skin examination: YES  Scribed By: Fern Cortez MS, PAJEROME          Again, thank you for allowing me to participate in the care of your patient.        Sincerely,        Fern Cortez PA-C

## 2021-04-20 NOTE — PATIENT INSTRUCTIONS
Wound Care Instructions     FOR SUTURE CARE     Right cheek  Right Upper cheek    [facility] [phone]                 AFTER 24 HOURS YOU SHOULD REMOVE THE BANDAGE AND BEGIN DAILY DRESSING CHANGES AS FOLLOWS:     1) Remove Dressing.     2) Clean and dry the area with tap water using a Q-tip or sterile gauze pad.     3) Apply Vaseline, Aquaphor, Polysporin ointment or Bacitracin ointment over entire wound.  Do NOT use Neosporin ointment.     4) Cover the wound with a band-aid, or a sterile non-stick gauze pad and micropore paper tape      REPEAT THESE INSTRUCTIONS AT LEAST ONCE A DAY UNTIL THE WOUND HAS COMPLETELY HEALED.    RETURN TO CLINIC FOR A NURSE-ONLY SUTURE REMOVAL APPOINTMENT IN 7 TO 10 DAYS. UNLESS YOUR PROVIDER SPECIFIES OTHERWISE.     It is an old wives tale that a wound heals better when it is exposed to air and allowed to dry out. The wound will heal faster with a better cosmetic result if it is kept moist with ointment and covered with a bandage.    **Do not let the wound dry out.**      Supplies Needed:      *Cotton tipped applicators (Q-tips)    *Vaseline, Aquaphor, Polysporin Ointment or Bacitracin Ointment (NOT NEOSPORIN)    *Band-aids or non-stick gauze pads and micropore paper tape.          PATIENT INFORMATION:  During the healing process you will notice a number of changes. All wounds develop a small halo of redness surrounding the wound.  This means healing is occurring. Severe itching with extensive redness usually indicates sensitivity to the ointment or bandage tape used to dress the wound.  You should call our office if this develops.      Swelling  and/or discoloration around your surgical site is common, particularly when performed around the eye.    After the wound is healed you may discontinue dressing changes.     You may experience a sensation of tightness as your wound heals. This is normal and will gradually subside.    Your healed wound may be sensitive to  temperature changes. This sensitivity improves with time, but if you re having a lot of discomfort, try to avoid temperature extremes.    Patients frequently experience itching after their wound appears to have healed because of the continue healing under the skin.  Plain Vaseline will help relieve the itching.      POSSIBLE COMPLICATIONS    BLEEDIN. Leave the bandage in place.  2. Use tightly rolled up gauze or a cloth to apply direct pressure over the bandage for 30  minutes.  3. Reapply pressure for an additional 30 minutes if necessary  4. Use additional gauze and tape to maintain pressure once the bleeding has stopped.

## 2021-04-27 NOTE — PATIENT INSTRUCTIONS
WOUND CARE INSTRUCTIONS  for  SUTURE REMOVAL      If there are any open or bleeding areas at the incision site you should begin to cover the area with a bandage daily as follows:    1) Clean and dry the area with plain tap water using a Q-tip or sterile gauze pad.  2) Apply Vaseline, Aquaphor, Polysporin or Bacitracin ointment to the open area.  3) Cover the wound with a band-aid or a sterile non-stick gauze pad and micropore paper tape.       *Once the bandages are removed, the scar will be red and firm (especially in the lip/chin area). This is normal and will fade in time. It might take 6-12 months for this to happen.     *Massaging the area will help the scar soften and fade quicker. Begin to massage the area in one month. To massage apply pressure directly and firmly over the scar with the fingertips and move in a circular motion. Massage the area for a few minutes several times a day. Continue to massage the site for several months.    *Numbness in the surgical area is expected. It might take 12-18 months for the feeling to return to normal. During this time sensations of itchiness, tingling and occasional sharp pains might be noted. These feelings are normal and will subside once the nerves have completely healed.

## 2021-04-27 NOTE — NURSING NOTE
Pt returned to clinic for suture removal. Pt has no complaints, denies pain. Bandage removed from right upper cheek, area cleansed with normal saline. Sutures removed.  Site is healing and wound edges approximating well. Ointment and bandage applied.     Advised to watch for signs/sx of infection; spreading redness, drainage, odor, fever. Call or report promptly to clinic. Pt given written instructions and informed to rtc as needed. Patient verbalized understanding.     ROSA Biggs-BSN-PHN  [facility]  [phone]

## 2021-04-28 ENCOUNTER — TELEPHONE (OUTPATIENT)
Dept: DERMATOLOGY | Facility: CLINIC | Age: 51
End: 2021-04-28

## 2021-04-28 ENCOUNTER — ALLIED HEALTH/NURSE VISIT (OUTPATIENT)
Dept: DERMATOLOGY | Facility: CLINIC | Age: 51
End: 2021-04-28
Payer: COMMERCIAL

## 2021-04-28 DIAGNOSIS — Z48.01 ENCOUNTER FOR CHANGE OR REMOVAL OF SURGICAL WOUND DRESSING: Primary | ICD-10-CM

## 2021-04-28 DIAGNOSIS — L93.0 DISCOID LUPUS: Primary | ICD-10-CM

## 2021-04-28 LAB — COPATH REPORT: NORMAL

## 2021-04-28 PROCEDURE — 99207 PR NO CHARGE NURSE ONLY: CPT

## 2021-04-28 RX ORDER — TRIAMCINOLONE ACETONIDE 5 MG/G
CREAM TOPICAL 2 TIMES DAILY
Status: CANCELLED | OUTPATIENT
Start: 2021-04-28

## 2021-04-28 RX ORDER — HYDROXYCHLOROQUINE SULFATE 200 MG/1
200 TABLET, FILM COATED ORAL DAILY
Status: CANCELLED | OUTPATIENT
Start: 2021-04-28

## 2021-04-28 NOTE — TELEPHONE ENCOUNTER
----- Message from Fern Cortez PA-C sent at 4/28/2021  2:20 PM CDT -----  Pathology confirmed lupus on her skin. For this I'd like to try an anti-inflammatory she applies twice daily. In addition, the medication Plaquenil (hydroxychloroquine) works well. She would take this twice daily. There is a rare risk of retinopathy so a yearly eye exam with an ophthalmologist is recommended.

## 2021-04-28 NOTE — TELEPHONE ENCOUNTER
Called and spoke to patient.    Educated patient on biopsy results- pathology confirmed lupus of the skin.     Tx includes:  -anti-inflammatory cream applied BID  -Plaquenil (hydroxychloroquine) BID    Informed patient there is a rare risk of retinopathy so a yearly eye exam with an ophthalmologist is recommended.    Patients opthalomolagist is who advised patient to f/u w/ Derm.     Patient would like to move forward w/ both suggested treatments.    Patient voiced understanding.    ROSA Biggs-BSN-PHN  [facility]  [phone]

## 2021-04-29 RX ORDER — PIMECROLIMUS 10 MG/G
CREAM TOPICAL
Qty: 60 G | Refills: 3 | Status: SHIPPED | OUTPATIENT
Start: 2021-04-29

## 2021-04-29 RX ORDER — HYDROXYCHLOROQUINE SULFATE 200 MG/1
TABLET, FILM COATED ORAL
Qty: 60 TABLET | Refills: 3 | Status: SHIPPED | OUTPATIENT
Start: 2021-04-29

## 2021-04-29 NOTE — TELEPHONE ENCOUNTER
Called patient- scheduled her for 9:100 am     Syl DUBONRN BSN  [facility]  [phone]

## 2021-04-29 NOTE — TELEPHONE ENCOUNTER
As early as she can. Ideally the morning, but I understand that her schedule makes this difficult. So she may be scheduled in the 4:45 slot but please have her come as early as she can.

## 2021-04-29 NOTE — TELEPHONE ENCOUNTER
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    Thank you,    Syl GOINSRN BSN  [facility]  [phone]  [facility]  [phone]

## 2021-10-24 ENCOUNTER — HEALTH MAINTENANCE LETTER (OUTPATIENT)
Age: 51
End: 2021-10-24

## 2022-02-13 ENCOUNTER — HEALTH MAINTENANCE LETTER (OUTPATIENT)
Age: 52
End: 2022-02-13

## 2022-06-05 ENCOUNTER — HEALTH MAINTENANCE LETTER (OUTPATIENT)
Age: 52
End: 2022-06-05

## 2022-10-15 ENCOUNTER — HEALTH MAINTENANCE LETTER (OUTPATIENT)
Age: 52
End: 2022-10-15

## 2022-12-03 ENCOUNTER — HEALTH MAINTENANCE LETTER (OUTPATIENT)
Age: 52
End: 2022-12-03

## 2023-04-26 ENCOUNTER — OFFICE VISIT (OUTPATIENT)
Dept: FAMILY MEDICINE | Facility: CLINIC | Age: 53
End: 2023-04-26
Payer: COMMERCIAL

## 2023-04-26 VITALS
HEIGHT: 69 IN | WEIGHT: 149.9 LBS | DIASTOLIC BLOOD PRESSURE: 88 MMHG | BODY MASS INDEX: 22.2 KG/M2 | OXYGEN SATURATION: 100 % | HEART RATE: 78 BPM | RESPIRATION RATE: 14 BRPM | TEMPERATURE: 98.5 F | SYSTOLIC BLOOD PRESSURE: 122 MMHG

## 2023-04-26 DIAGNOSIS — R22.1 NECK MASS: Primary | ICD-10-CM

## 2023-04-26 PROCEDURE — 99203 OFFICE O/P NEW LOW 30 MIN: CPT

## 2023-04-26 ASSESSMENT — PAIN SCALES - GENERAL: PAINLEVEL: MILD PAIN (2)

## 2023-04-26 NOTE — PROGRESS NOTES
Assessment & Plan     (R22.1) Neck mass  (primary encounter diagnosis)  Comment: unsure of etiology would like to rule in/out more insidious causes given mass characteristics. CT Neck ordered. Placed priority order given patient anxiety with mass Will follow up with patient on results and if further recommendations necessary. Patient agreeable with plan of care and at this point patient will follow up as needed unless acute concerns arise in the meantime.  Plan: CT Soft Tissue Neck w/o & w Contrast         Nicotine/Tobacco Cessation:  She reports that she has been smoking cigarettes. She has never used smokeless tobacco.  Nicotine/Tobacco Cessation Plan:   Information offered: Patient not interested at this time    JAYDA Beal CNP  [facility]    Subjective   Mary Anne is a 52 year old, presenting for the following health issues:  Mass (On neck behind left ear)        4/26/2023     9:05 AM   Additional Questions   Roomed by Lexii Brandt    History of Present Illness       Reason for visit:  Swollen gland on keft side behind/under ear. It has grown bigger and harder in the last 2 1/2 weeks.  Symptom onset:  3-4 weeks ago  Symptoms include:  Little pain, sometimes a headache. Feels like a rock in my face  Symptom intensity:  Mild  Symptom progression:  Worsening  Had these symptoms before:  No  What makes it worse:  No  What makes it better:  No    She eats 2-3 servings of fruits and vegetables daily.She consumes 4 sweetened beverage(s) daily.She exercises with enough effort to increase her heart rate 20 to 29 minutes per day.  She exercises with enough effort to increase her heart rate 3 or less days per week.   She is taking medications regularly.     When noticed patient felt she looked sick but did not feel sick  Node has become bigger over the course of last 2.5 weeks  No illnesses since   Palpating lump does not hurt   Headache at   Family history of lung cancer  "induced by smoking   Patient has a history of lupus but has been in remission from August 2011  Denies fever, chills, myalgias, rashes/lesions.      Review of Systems   Constitutional, HEENT, cardiovascular, pulmonary, GI, , skin, endocrine and psych systems are negative, except as otherwise noted.      Objective    /88 (BP Location: Right arm, Patient Position: Sitting, Cuff Size: Adult Regular)   Pulse 78   Temp 98.5  F (36.9  C) (Oral)   Resp 14   Ht 1.74 m (5' 8.5\")   Wt 68 kg (149 lb 14.4 oz)   LMP  (LMP Unknown)   SpO2 100%   BMI 22.46 kg/m    Body mass index is 22.46 kg/m .  Physical Exam  Vitals and nursing note reviewed.   Constitutional:       General: She is not in acute distress.     Appearance: Normal appearance. She is not ill-appearing.   HENT:      Right Ear: Tympanic membrane, ear canal and external ear normal. There is no impacted cerumen.      Left Ear: Tympanic membrane, ear canal and external ear normal. There is no impacted cerumen.      Nose: No congestion or rhinorrhea.      Mouth/Throat:      Mouth: Mucous membranes are moist.      Pharynx: No oropharyngeal exudate or posterior oropharyngeal erythema.   Eyes:      General: No scleral icterus.        Right eye: No discharge.         Left eye: No discharge.      Conjunctiva/sclera: Conjunctivae normal.      Pupils: Pupils are equal, round, and reactive to light.   Neck:      Thyroid: No thyroid mass, thyromegaly or thyroid tenderness.   Cardiovascular:      Rate and Rhythm: Normal rate and regular rhythm.      Heart sounds: No murmur heard.     No friction rub. No gallop.   Pulmonary:      Effort: No respiratory distress.      Breath sounds: Normal breath sounds. No stridor. No wheezing, rhonchi or rales.   Musculoskeletal:      Cervical back: No rigidity or tenderness.   Lymphadenopathy:      Head:      Right side of head: No submental, submandibular, tonsillar, preauricular, posterior auricular or occipital adenopathy.      " Left side of head: Tonsillar adenopathy present. No submental, submandibular, preauricular, posterior auricular or occipital adenopathy.      Cervical: Cervical adenopathy present.      Right cervical: No superficial, deep or posterior cervical adenopathy.     Left cervical: No superficial, deep or posterior cervical adenopathy.      Upper Body:      Right upper body: No supraclavicular or axillary adenopathy.      Left upper body: No supraclavicular or axillary adenopathy.      Comments: ~3 cm x 2.5 cm lump at tonsillar lymph node area, immobile, firm, nontender   Skin:     General: Skin is warm and dry.      Capillary Refill: Capillary refill takes less than 2 seconds.   Neurological:      General: No focal deficit present.      Mental Status: She is alert and oriented to person, place, and time.   Psychiatric:         Mood and Affect: Mood normal.         Behavior: Behavior normal.         Thought Content: Thought content normal.         Judgment: Judgment normal.

## 2023-04-27 ENCOUNTER — TELEPHONE (OUTPATIENT)
Dept: OTOLARYNGOLOGY | Facility: CLINIC | Age: 53
End: 2023-04-27
Payer: COMMERCIAL

## 2023-04-27 ENCOUNTER — HOSPITAL ENCOUNTER (OUTPATIENT)
Dept: CT IMAGING | Facility: CLINIC | Age: 53
Discharge: HOME | End: 2023-04-27
Payer: COMMERCIAL

## 2023-04-27 DIAGNOSIS — R22.1 NECK MASS: ICD-10-CM

## 2023-04-27 DIAGNOSIS — R22.1 NECK MASS: Primary | ICD-10-CM

## 2023-04-27 PROCEDURE — 250N000009 HC RX 250

## 2023-04-27 PROCEDURE — 70491 CT SOFT TISSUE NECK W/DYE: CPT

## 2023-04-27 PROCEDURE — 250N000011 HC RX IP 250 OP 636

## 2023-04-27 RX ORDER — IOPAMIDOL 755 MG/ML
100 INJECTION, SOLUTION INTRAVASCULAR ONCE
Status: COMPLETED | OUTPATIENT
Start: 2023-04-27 | End: 2023-04-27

## 2023-04-27 RX ADMIN — SODIUM CHLORIDE 60 ML: 9 INJECTION, SOLUTION INTRAVENOUS at 14:37

## 2023-04-27 RX ADMIN — IOPAMIDOL 100 ML: 755 INJECTION, SOLUTION INTRAVENOUS at 14:37

## 2023-04-27 NOTE — TELEPHONE ENCOUNTER
[facility] Call Center    Phone Message    May a detailed message be left on voicemail: no     Reason for Call: Appointment Intake    Referring Provider Name: Fox Meade APRN CNP  Diagnosis and/or Symptoms: R22.1 (ICD-10-CM) - Neck mass  Heterogeneous mass superficial left parotid gland measuring up to 3.9 cm found on CT scan. Evaluation benign vs malignant etiology recommended.    Sending to clinic due to mass    Action Taken: Other: ENT    Travel Screening: Not Applicable

## 2023-04-27 NOTE — PATIENT INSTRUCTIONS
[facility] in [place], [place]  -Dr. Compa Olguin  - schedule through text: [phone]

## 2023-04-28 ENCOUNTER — MYC MEDICAL ADVICE (OUTPATIENT)
Dept: FAMILY MEDICINE | Facility: CLINIC | Age: 53
End: 2023-04-28
Payer: COMMERCIAL

## 2023-04-28 PROCEDURE — 88173 CYTOPATH EVAL FNA REPORT: CPT | Mod: TC,ORL

## 2023-04-28 PROCEDURE — 88173 CYTOPATH EVAL FNA REPORT: CPT | Mod: 26

## 2023-04-28 PROCEDURE — 88173 CYTOPATH EVAL FNA REPORT: CPT | Mod: TC,ORL | Performed by: OTOLARYNGOLOGY

## 2023-04-28 NOTE — TELEPHONE ENCOUNTER
Responded via [employer].     nOeyda GUTIERREZ RN, BSN, PHN  [facility]  [phone]

## 2023-05-01 ENCOUNTER — LAB REQUISITION (OUTPATIENT)
Dept: LAB | Facility: CLINIC | Age: 53
End: 2023-05-01
Payer: COMMERCIAL

## 2023-05-01 DIAGNOSIS — K11.8 OTHER DISEASES OF SALIVARY GLANDS: ICD-10-CM

## 2023-05-01 NOTE — TELEPHONE ENCOUNTER
FUTURE VISIT INFORMATION:      FUTURE VISIT INFORMATION:    Date: 5/17/23    Time: 3:45 PM    Location: [facility]  REFERRAL INFORMATION:    Referring provider: Fox Meade APRN CNP    Referring providers clinic: [facility]    Reason for visit/diagnosis:  Per notes in chart per Yoshi, Neck mass [R22.1], ref'd by Fox Meade APRN CNP, rec's in EPIC, pt made appt, [facility] location    RECORDS REQUESTED FROM:       Clinic name Comments Records Status Imaging Status   [facility] 4/26/23 OV with Fox Meade APRN CNP Epic    Imaging CT neck 4/27/23 [facility]

## 2023-05-03 LAB
PATH REPORT.COMMENTS IMP SPEC: NORMAL
PATH REPORT.COMMENTS IMP SPEC: NORMAL
PATH REPORT.FINAL DX SPEC: NORMAL
PATH REPORT.GROSS SPEC: NORMAL
PATH REPORT.MICROSCOPIC SPEC OTHER STN: NORMAL
PATH REPORT.RELEVANT HX SPEC: NORMAL

## 2023-05-17 ENCOUNTER — PRE VISIT (OUTPATIENT)
Dept: OTOLARYNGOLOGY | Facility: CLINIC | Age: 53
End: 2023-05-17

## 2023-05-19 ENCOUNTER — PATIENT OUTREACH (OUTPATIENT)
Dept: OTOLARYNGOLOGY | Facility: CLINIC | Age: 53
End: 2023-05-19
Payer: COMMERCIAL

## 2023-05-19 NOTE — TELEPHONE ENCOUNTER
Called and spoke with patient regarding the following fine needle biopsy results:    Final Diagnosis   Parotid gland, left, mass (3.9 cm) fine-needle aspiration:                 Interpretation:                  Neoplasm; benign.  Consistent with Warthin Tumor.  See comment.                   Adequacy:                 Satisfactory for evaluation.         Advised we can have patient see Dr. Cr for consult to discuss. Patient indicates she is already scheduled to meeting with provider [facility] and she would like to keep that appt. She was encouraged to call with further questions or concerns.       Em Varela, RN, BSN

## 2023-06-11 ENCOUNTER — HEALTH MAINTENANCE LETTER (OUTPATIENT)
Age: 53
End: 2023-06-11

## 2023-06-14 ENCOUNTER — HOSPITAL ENCOUNTER (EMERGENCY)
Facility: CLINIC | Age: 53
Discharge: HOME | End: 2023-06-14
Attending: EMERGENCY MEDICINE | Admitting: EMERGENCY MEDICINE
Payer: COMMERCIAL

## 2023-06-14 ENCOUNTER — APPOINTMENT (OUTPATIENT)
Dept: GENERAL RADIOLOGY | Facility: CLINIC | Age: 53
End: 2023-06-14
Attending: EMERGENCY MEDICINE
Payer: COMMERCIAL

## 2023-06-14 VITALS
WEIGHT: 146 LBS | BODY MASS INDEX: 22.13 KG/M2 | DIASTOLIC BLOOD PRESSURE: 85 MMHG | HEIGHT: 68 IN | RESPIRATION RATE: 18 BRPM | OXYGEN SATURATION: 98 % | SYSTOLIC BLOOD PRESSURE: 124 MMHG | TEMPERATURE: 97.1 F | HEART RATE: 85 BPM

## 2023-06-14 DIAGNOSIS — R07.9 ACUTE CHEST PAIN: ICD-10-CM

## 2023-06-14 LAB
ANION GAP SERPL CALCULATED.3IONS-SCNC: 13 MMOL/L (ref 7–15)
BUN SERPL-MCNC: 8.4 MG/DL (ref 6–20)
CALCIUM SERPL-MCNC: 9.7 MG/DL (ref 8.6–10)
CHLORIDE SERPL-SCNC: 103 MMOL/L (ref 98–107)
CREAT SERPL-MCNC: 0.79 MG/DL (ref 0.51–0.95)
D DIMER PPP FEU-MCNC: 0.35 UG/ML FEU (ref 0–0.5)
DEPRECATED HCO3 PLAS-SCNC: 25 MMOL/L (ref 22–29)
ERYTHROCYTE [DISTWIDTH] IN BLOOD BY AUTOMATED COUNT: 12.6 % (ref 10–15)
GFR SERPL CREATININE-BSD FRML MDRD: 90 ML/MIN/1.73M2
GLUCOSE SERPL-MCNC: 121 MG/DL (ref 70–99)
HCT VFR BLD AUTO: 42.3 % (ref 35–47)
HGB BLD-MCNC: 14.3 G/DL (ref 11.7–15.7)
HOLD SPECIMEN: NORMAL
HOLD SPECIMEN: NORMAL
MCH RBC QN AUTO: 31.4 PG (ref 26.5–33)
MCHC RBC AUTO-ENTMCNC: 33.8 G/DL (ref 31.5–36.5)
MCV RBC AUTO: 93 FL (ref 78–100)
PLATELET # BLD AUTO: 254 10E3/UL (ref 150–450)
POTASSIUM SERPL-SCNC: 4.1 MMOL/L (ref 3.4–5.3)
RBC # BLD AUTO: 4.56 10E6/UL (ref 3.8–5.2)
SODIUM SERPL-SCNC: 141 MMOL/L (ref 136–145)
TROPONIN T SERPL HS-MCNC: <6 NG/L
TROPONIN T SERPL HS-MCNC: <6 NG/L
WBC # BLD AUTO: 4.1 10E3/UL (ref 4–11)

## 2023-06-14 PROCEDURE — 80048 BASIC METABOLIC PNL TOTAL CA: CPT | Performed by: EMERGENCY MEDICINE

## 2023-06-14 PROCEDURE — 85379 FIBRIN DEGRADATION QUANT: CPT | Performed by: EMERGENCY MEDICINE

## 2023-06-14 PROCEDURE — 36415 COLL VENOUS BLD VENIPUNCTURE: CPT | Performed by: EMERGENCY MEDICINE

## 2023-06-14 PROCEDURE — 84484 ASSAY OF TROPONIN QUANT: CPT | Performed by: EMERGENCY MEDICINE

## 2023-06-14 PROCEDURE — 71046 X-RAY EXAM CHEST 2 VIEWS: CPT

## 2023-06-14 PROCEDURE — 85027 COMPLETE CBC AUTOMATED: CPT | Performed by: EMERGENCY MEDICINE

## 2023-06-14 PROCEDURE — 99285 EMERGENCY DEPT VISIT HI MDM: CPT | Mod: 25

## 2023-06-14 PROCEDURE — 250N000011 HC RX IP 250 OP 636: Performed by: EMERGENCY MEDICINE

## 2023-06-14 PROCEDURE — 93005 ELECTROCARDIOGRAM TRACING: CPT

## 2023-06-14 RX ORDER — KETOROLAC TROMETHAMINE 15 MG/ML
15 INJECTION, SOLUTION INTRAMUSCULAR; INTRAVENOUS ONCE
Status: COMPLETED | OUTPATIENT
Start: 2023-06-14 | End: 2023-06-14

## 2023-06-14 RX ADMIN — KETOROLAC TROMETHAMINE 15 MG: 15 INJECTION, SOLUTION INTRAMUSCULAR; INTRAVENOUS at 20:13

## 2023-06-14 ASSESSMENT — ACTIVITIES OF DAILY LIVING (ADL): ADLS_ACUITY_SCORE: 35

## 2023-06-14 NOTE — ED TRIAGE NOTES
Pt with c/o non-radiating, mid-sternal CP that started while walking to her car about 20 mins PTA. Also reports pain with inspiration. Denies cardiac hx. No hormone use or recent travels. No KRANTHI, diaphoresis or nausea. ABC intact.

## 2023-06-15 LAB
ATRIAL RATE - MUSE: 83 BPM
DIASTOLIC BLOOD PRESSURE - MUSE: NORMAL MMHG
INTERPRETATION ECG - MUSE: NORMAL
P AXIS - MUSE: 73 DEGREES
PR INTERVAL - MUSE: 148 MS
QRS DURATION - MUSE: 92 MS
QT - MUSE: 376 MS
QTC - MUSE: 441 MS
R AXIS - MUSE: 34 DEGREES
SYSTOLIC BLOOD PRESSURE - MUSE: NORMAL MMHG
T AXIS - MUSE: 60 DEGREES
VENTRICULAR RATE- MUSE: 83 BPM

## 2023-06-15 NOTE — ED PROVIDER NOTES
"  History     Chief Complaint:  Chest Pain       HPI   Mary Anne Angel is a 52 year old female who presents with chest pain.  Patient reports that she was in her otherwise normal state of health earlier today, including at work.  After work, she was looking at homes.  She reports telling a few jokes and laughing, though shortly after leaving the home, developed the onset of anterior chest discomfort.  She notes taking a deep breath exacerbated the pain, and pain was also exacerbated by movements such as twisting, or bending.  She denies any exertional exacerbation of symptoms.  She denies any preceding strenuous activity.  She further endorses a recent cough and some dizziness.  She has no radiation of pain towards her back, neck, shoulders, nor jaw.  She denies any diaphoresis.  She denies any personal history of cardiac disease.  No personal history of venous thromboembolic disease.  No personal or known family history of aortic disease such as dissection or aneurysm.  She has not taken any analgesia prior to evaluation.    Independent Historian:   None - Patient Only    Review of External Notes:   Note from [facility] clinic on 23 reviewed where patient was evaluated for a neck mass     Medications:    Vitamin D3  Vibramycin  Plaquenil  Metrogel  Elidel  Probiotic  Vasocidin  Tobradex  Zanaflex  Naprosyn    Past Medical History:    Arthritis  Lupus  Alcohol abuse  Episcleritis of right eye  Meibomian gland dysfunction  Rosacea  Chronic conjunctivitis or right eye  Ovarian cyst  Lung nodule    Past Surgical History:      Hernia repair     Physical Exam     Patient Vitals for the past 24 hrs:   BP Temp Temp src Pulse Resp SpO2 Height Weight   23 2219 124/85 -- -- 85 18 98 % -- --   23 1856 (!) 149/86 97.1  F (36.2  C) Temporal 79 18 100 % -- --   23 1851 -- -- -- -- -- -- 1.727 m (5' 8\") 66.2 kg (146 lb)        Physical Exam  General:   Well-nourished   Speaking in full " sentences  Eyes:   Conjunctiva without injection or scleral icterus  ENT:   Moist mucous membranes   Nares patent   Pinnae normal  Neck:   Full ROM   No stiffness appreciated  Resp:   Lungs CTAB   No crackles, wheezing or audible rubs   Good air movement  CV:    Normal rate, regular rhythm   S1 and S2 present   No murmur, gallop or rub  GI:   BS present   Abdomen soft without distention   Non-tender to light and deep palpation   No guarding or rebound tenderness  Skin:   Warm, dry, well perfused   No rashes or open wounds on exposed skin  MSK:   Moves all extremities   No focal deformities or swelling   Tenderness to palpation over anterior chest wall   Symptoms are exacerbated with shoulder extension, as well as torsional movements about her chest, particularly to the left side  Neuro:   Alert   Answers questions appropriately   Moves all extremities equally   Gait stable  Psych:   Normal affect, normal mood        Emergency Department Course   ECG  ECG taken at 1847, ECG read at 1907  Normal sinus rhythm  Normal ECG   Rate 83 bpm. AZ interval 148 ms. QRS duration 92 ms. QT/QTc 376/441 ms. P-R-T axes 73 34 60.     Imaging:  Chest XR,  PA & LAT   Final Result   IMPRESSION: Heart size is normal. Lungs are clear bilaterally. Mediastinum and visualized bony structures are unremarkable.         Report per radiology    Laboratory:  Labs Ordered and Resulted from Time of ED Arrival to Time of ED Departure   BASIC METABOLIC PANEL - Abnormal       Result Value    Sodium 141      Potassium 4.1      Chloride 103      Carbon Dioxide (CO2) 25      Anion Gap 13      Urea Nitrogen 8.4      Creatinine 0.79      Calcium 9.7      Glucose 121 (*)     GFR Estimate 90     TROPONIN T, HIGH SENSITIVITY - Normal    Troponin T, High Sensitivity <6     CBC WITH PLATELETS - Normal    WBC Count 4.1      RBC Count 4.56      Hemoglobin 14.3      Hematocrit 42.3      MCV 93      MCH 31.4      MCHC 33.8      RDW 12.6      Platelet Count 254     D  DIMER QUANTITATIVE - Normal    D-Dimer Quantitative 0.35     TROPONIN T, HIGH SENSITIVITY - Normal    Troponin T, High Sensitivity <6        Emergency Department Course & Assessments:    PSS-3    Date and Time Over the past 2 weeks have you felt down, depressed, or hopeless? Over the past 2 weeks have you had thoughts of killing yourself? Have you ever attempted to kill yourself? When did this last happen? User   06/14/23 1851 no no yes -- AV        2220 I performed suicide assessment with the patient. She informs me these issues last occurred when she was a kid.  She currently feels safe, has no suicidal ideation, and does not wish to speak with DEC    Interventions:  Medications   ketorolac (TORADOL) injection 15 mg (15 mg Intravenous $Given 6/14/23 2013)      Assessments:  2003 I obtained history and examined the patient as noted above.  2209 I rechecked the patient and explained findings. She is feeling better. I offered Ct scan and with shared decision making we decided against it.    Independent Interpretation (X-rays, CTs, rhythm strip):  I reviewed patients chest xray, no acute infiltrate.    Consultations/Discussion of Management or Tests:  None     Social Determinants of Health affecting care:   None    Disposition:  The patient was discharged to home.     Impression & Plan      Medical Decision Making:  Mary Anne Angel is a very pleasant 52-year-old female presenting to the emergency department for evaluation of chest pain.  VS on presentation reveal elevated BP, which improved on recheck.  Differential diagnosis is broad, including but was not limited to, ACS, PE, pneumothorax, pneumonia, aortic dissection, musculoskeletal pain, atypical reflux, among others.  EKG obtained, demonstrating sinus rhythm without findings of acute ischemia.  Her initial and repeat high-sensitivity troponin have returned undetectable, and given above history, exacerbating/alleviating factors, absence of association with  exertion, do feel ACS to be unlikely.  HEART score correlates with low risk for adverse cardiac outcome.  Chest x-ray negative for pneumothorax or pneumonia.  Pulmonary embolism unlikely as patient is low risk utilizing Wells criteria and D-dimer returned within normal limits.  Do feel CT chest to be deferred safely.  I am strongly suspicious of a musculoskeletal component is contributing, given her exacerbation with palpation, as well as movements about her chest, including movements of her upper extremities.  There is also a positional component to her symptoms, including exacerbation as she actively lifts her torso backwards.  I strongly considered aortic pathology given rather abrupt onset of symptoms though clinically feel this to be less likely.  Mediastinum is not widened on chest x-ray and radial pulses are 2+ bilaterally and symmetric.  I did offer CT of the chest for further characterization of possible aortic disease, which patient declines, and I feel it to be very reasonable given my low pretest mobility for this.  We discussed trial of anti-inflammatory medications to help with pain, and patient is already noting improvement in symptoms following Toradol.  She is to follow-up PCP in 2 to 3 days for recheck.  Return to ER with worsening pain, shortness of breath, or development of any other new or troubling symptoms.  Patient comfortable with outlined plan of care and questions answered prior to discharge.    Diagnosis:    ICD-10-CM    1. Acute chest pain  R07.9          Scribe Disclosure:  I, CELINA GARVEY, am serving as a scribe at 10:09 PM on 6/14/2023 to document services personally performed by Macario Zhou MD based on my observations and the provider's statements to me.          Macario Zhou MD  06/15/23 8037       Macario Zhou MD  06/15/23 1020

## 2023-06-15 NOTE — DISCHARGE INSTRUCTIONS
Please monitor symptoms closely    Continue with tylenol / ibuprofen as needed for pain or discomfort    Return to ER with worsening pain, shortness of breath, fainting spells or any other new or troubling symptoms.    Discharge Instructions  Chest Pain    You have been seen today for chest pain or discomfort.  At this time, your provider has found no signs that your chest pain is due to a serious or life-threatening condition, (or you have declined more testing and/or admission to the hospital). However, sometimes there is a serious problem that does not show up right away. Your evaluation today may not be complete and you may need further testing and evaluation.     Generally, every Emergency Department visit should have a follow-up clinic visit with either a primary or a specialty clinic/provider. Please follow-up as instructed by your emergency provider today.  Return to the Emergency Department if:  Your chest pain changes, gets worse, starts to happen more often, or comes with less activity.  You are newly short of breath.  You get very weak or tired.  You pass out or faint.  You have any new symptoms, like fever, cough, numb legs, or you cough up blood.  You have anything else that worries you.    Until you follow-up with your regular provider, please do the following:  Take one aspirin daily unless you have an allergy or are told not to by your provider.  If a stress test appointment has been made, go to the appointment.  If you have questions, contact your regular provider.  Follow-up with your regular provider/clinic as directed; this is very important.    If you were given a prescription for medicine here today, be sure to read all of the information (including the package insert) that comes with your prescription.  This will include important information about the medicine, its side effects, and any warnings that you need to know about.  The pharmacist who fills the prescription can provide more  information and answer questions you may have about the medicine.  If you have questions or concerns that the pharmacist cannot address, please call or return to the Emergency Department.       Remember that you can always come back to the Emergency Department if you are not able to see your regular provider in the amount of time listed above, if you get any new symptoms, or if there is anything that worries you.

## 2023-06-27 ENCOUNTER — TELEPHONE (OUTPATIENT)
Dept: INTERNAL MEDICINE | Facility: CLINIC | Age: 53
End: 2023-06-27
Payer: COMMERCIAL

## 2023-06-27 NOTE — TELEPHONE ENCOUNTER
Patient Quality Outreach    Patient is due for the following:   Breast Cancer Screening - Mammogram    Next Steps:   Schedule a office visit for mammogram    Type of outreach:    Sent letter.    Next Steps:  Reach out within 90 days via Phone.    Max number of attempts reached: No. Will try again in 90 days if patient still on fail list.    Questions for provider review:    None           Cristel Richards

## 2024-01-07 ENCOUNTER — HEALTH MAINTENANCE LETTER (OUTPATIENT)
Age: 54
End: 2024-01-07

## 2025-01-25 ENCOUNTER — HEALTH MAINTENANCE LETTER (OUTPATIENT)
Age: 55
End: 2025-01-25

## 2025-06-14 ENCOUNTER — HEALTH MAINTENANCE LETTER (OUTPATIENT)
Age: 55
End: 2025-06-14